# Patient Record
Sex: MALE | Race: WHITE | NOT HISPANIC OR LATINO | URBAN - METROPOLITAN AREA
[De-identification: names, ages, dates, MRNs, and addresses within clinical notes are randomized per-mention and may not be internally consistent; named-entity substitution may affect disease eponyms.]

---

## 2017-09-23 ENCOUNTER — EMERGENCY (EMERGENCY)
Facility: HOSPITAL | Age: 62
LOS: 1 days | Discharge: HOME | End: 2017-09-23
Attending: EMERGENCY MEDICINE | Admitting: EMERGENCY MEDICINE
Payer: COMMERCIAL

## 2017-09-23 VITALS
HEART RATE: 76 BPM | TEMPERATURE: 98 F | OXYGEN SATURATION: 95 % | WEIGHT: 173.06 LBS | SYSTOLIC BLOOD PRESSURE: 156 MMHG | DIASTOLIC BLOOD PRESSURE: 97 MMHG | RESPIRATION RATE: 18 BRPM

## 2017-09-23 LAB
ALBUMIN SERPL ELPH-MCNC: 4.2 G/DL — SIGNIFICANT CHANGE UP (ref 3.3–5)
ALP SERPL-CCNC: 87 U/L — SIGNIFICANT CHANGE UP (ref 40–120)
ALT FLD-CCNC: 23 U/L RC — SIGNIFICANT CHANGE UP (ref 10–45)
ANION GAP SERPL CALC-SCNC: 15 MMOL/L — SIGNIFICANT CHANGE UP (ref 5–17)
APPEARANCE UR: ABNORMAL
AST SERPL-CCNC: 19 U/L — SIGNIFICANT CHANGE UP (ref 10–40)
BASOPHILS # BLD AUTO: 0 K/UL — SIGNIFICANT CHANGE UP (ref 0–0.2)
BASOPHILS NFR BLD AUTO: 0.5 % — SIGNIFICANT CHANGE UP (ref 0–2)
BILIRUB SERPL-MCNC: 0.5 MG/DL — SIGNIFICANT CHANGE UP (ref 0.2–1.2)
BILIRUB UR-MCNC: NEGATIVE — SIGNIFICANT CHANGE UP
BUN SERPL-MCNC: 23 MG/DL — SIGNIFICANT CHANGE UP (ref 7–23)
CALCIUM SERPL-MCNC: 9 MG/DL — SIGNIFICANT CHANGE UP (ref 8.4–10.5)
CHLORIDE SERPL-SCNC: 102 MMOL/L — SIGNIFICANT CHANGE UP (ref 96–108)
CO2 SERPL-SCNC: 25 MMOL/L — SIGNIFICANT CHANGE UP (ref 22–31)
COLOR SPEC: YELLOW — SIGNIFICANT CHANGE UP
CREAT SERPL-MCNC: 1.26 MG/DL — SIGNIFICANT CHANGE UP (ref 0.5–1.3)
DIFF PNL FLD: ABNORMAL
EOSINOPHIL # BLD AUTO: 0.4 K/UL — SIGNIFICANT CHANGE UP (ref 0–0.5)
EOSINOPHIL NFR BLD AUTO: 4.7 % — SIGNIFICANT CHANGE UP (ref 0–6)
GLUCOSE SERPL-MCNC: 109 MG/DL — HIGH (ref 70–99)
GLUCOSE UR QL: NEGATIVE — SIGNIFICANT CHANGE UP
HCT VFR BLD CALC: 44.1 % — SIGNIFICANT CHANGE UP (ref 39–50)
HGB BLD-MCNC: 15.2 G/DL — SIGNIFICANT CHANGE UP (ref 13–17)
KETONES UR-MCNC: NEGATIVE — SIGNIFICANT CHANGE UP
LEUKOCYTE ESTERASE UR-ACNC: NEGATIVE — SIGNIFICANT CHANGE UP
LIDOCAIN IGE QN: 17 U/L — SIGNIFICANT CHANGE UP (ref 7–60)
LYMPHOCYTES # BLD AUTO: 1.2 K/UL — SIGNIFICANT CHANGE UP (ref 1–3.3)
LYMPHOCYTES # BLD AUTO: 13.8 % — SIGNIFICANT CHANGE UP (ref 13–44)
MCHC RBC-ENTMCNC: 31.7 PG — SIGNIFICANT CHANGE UP (ref 27–34)
MCHC RBC-ENTMCNC: 34.4 GM/DL — SIGNIFICANT CHANGE UP (ref 32–36)
MCV RBC AUTO: 92 FL — SIGNIFICANT CHANGE UP (ref 80–100)
MONOCYTES # BLD AUTO: 0.7 K/UL — SIGNIFICANT CHANGE UP (ref 0–0.9)
MONOCYTES NFR BLD AUTO: 8 % — SIGNIFICANT CHANGE UP (ref 2–14)
NEUTROPHILS # BLD AUTO: 6.6 K/UL — SIGNIFICANT CHANGE UP (ref 1.8–7.4)
NEUTROPHILS NFR BLD AUTO: 73.1 % — SIGNIFICANT CHANGE UP (ref 43–77)
NITRITE UR-MCNC: NEGATIVE — SIGNIFICANT CHANGE UP
PH UR: 5.5 — SIGNIFICANT CHANGE UP (ref 5–8)
PLATELET # BLD AUTO: 224 K/UL — SIGNIFICANT CHANGE UP (ref 150–400)
POTASSIUM SERPL-MCNC: 3.9 MMOL/L — SIGNIFICANT CHANGE UP (ref 3.5–5.3)
POTASSIUM SERPL-SCNC: 3.9 MMOL/L — SIGNIFICANT CHANGE UP (ref 3.5–5.3)
PROT SERPL-MCNC: 7.1 G/DL — SIGNIFICANT CHANGE UP (ref 6–8.3)
PROT UR-MCNC: 30 MG/DL
RBC # BLD: 4.79 M/UL — SIGNIFICANT CHANGE UP (ref 4.2–5.8)
RBC # FLD: 12.2 % — SIGNIFICANT CHANGE UP (ref 10.3–14.5)
RBC CASTS # UR COMP ASSIST: >50 /HPF (ref 0–2)
SODIUM SERPL-SCNC: 142 MMOL/L — SIGNIFICANT CHANGE UP (ref 135–145)
SP GR SPEC: 1.02 — SIGNIFICANT CHANGE UP (ref 1.01–1.02)
UROBILINOGEN FLD QL: NEGATIVE — SIGNIFICANT CHANGE UP
WBC # BLD: 9 K/UL — SIGNIFICANT CHANGE UP (ref 3.8–10.5)
WBC # FLD AUTO: 9 K/UL — SIGNIFICANT CHANGE UP (ref 3.8–10.5)

## 2017-09-23 PROCEDURE — 74176 CT ABD & PELVIS W/O CONTRAST: CPT | Mod: 26

## 2017-09-23 PROCEDURE — 99285 EMERGENCY DEPT VISIT HI MDM: CPT | Mod: 25

## 2017-09-23 PROCEDURE — 99284 EMERGENCY DEPT VISIT MOD MDM: CPT

## 2017-09-23 RX ORDER — ONDANSETRON 8 MG/1
4 TABLET, FILM COATED ORAL ONCE
Qty: 0 | Refills: 0 | Status: COMPLETED | OUTPATIENT
Start: 2017-09-23 | End: 2017-09-23

## 2017-09-23 RX ORDER — SODIUM CHLORIDE 9 MG/ML
1000 INJECTION INTRAMUSCULAR; INTRAVENOUS; SUBCUTANEOUS ONCE
Qty: 0 | Refills: 0 | Status: COMPLETED | OUTPATIENT
Start: 2017-09-23 | End: 2017-09-23

## 2017-09-23 RX ORDER — MORPHINE SULFATE 50 MG/1
4 CAPSULE, EXTENDED RELEASE ORAL ONCE
Qty: 0 | Refills: 0 | Status: DISCONTINUED | OUTPATIENT
Start: 2017-09-23 | End: 2017-09-23

## 2017-09-23 RX ORDER — KETOROLAC TROMETHAMINE 30 MG/ML
30 SYRINGE (ML) INJECTION ONCE
Qty: 0 | Refills: 0 | Status: DISCONTINUED | OUTPATIENT
Start: 2017-09-23 | End: 2017-09-23

## 2017-09-23 RX ORDER — SODIUM CHLORIDE 9 MG/ML
3 INJECTION INTRAMUSCULAR; INTRAVENOUS; SUBCUTANEOUS ONCE
Qty: 0 | Refills: 0 | Status: COMPLETED | OUTPATIENT
Start: 2017-09-23 | End: 2017-09-23

## 2017-09-23 RX ORDER — TAMSULOSIN HYDROCHLORIDE 0.4 MG/1
0.4 CAPSULE ORAL AT BEDTIME
Qty: 0 | Refills: 0 | Status: DISCONTINUED | OUTPATIENT
Start: 2017-09-23 | End: 2017-09-27

## 2017-09-23 RX ADMIN — ONDANSETRON 4 MILLIGRAM(S): 8 TABLET, FILM COATED ORAL at 20:53

## 2017-09-23 RX ADMIN — SODIUM CHLORIDE 3 MILLILITER(S): 9 INJECTION INTRAMUSCULAR; INTRAVENOUS; SUBCUTANEOUS at 20:53

## 2017-09-23 RX ADMIN — SODIUM CHLORIDE 2000 MILLILITER(S): 9 INJECTION INTRAMUSCULAR; INTRAVENOUS; SUBCUTANEOUS at 23:59

## 2017-09-23 RX ADMIN — MORPHINE SULFATE 4 MILLIGRAM(S): 50 CAPSULE, EXTENDED RELEASE ORAL at 22:52

## 2017-09-23 RX ADMIN — MORPHINE SULFATE 4 MILLIGRAM(S): 50 CAPSULE, EXTENDED RELEASE ORAL at 22:38

## 2017-09-23 RX ADMIN — TAMSULOSIN HYDROCHLORIDE 0.4 MILLIGRAM(S): 0.4 CAPSULE ORAL at 23:58

## 2017-09-23 RX ADMIN — MORPHINE SULFATE 4 MILLIGRAM(S): 50 CAPSULE, EXTENDED RELEASE ORAL at 20:53

## 2017-09-23 NOTE — ED ADULT NURSE NOTE - OBJECTIVE STATEMENT
62 y/o male pmh 60 y/o male A&Ox4, pmh MI with stents, HTN, kidney stones, HLD, presents to ED with c.o left sided chest and flank pain that radiates to left groin. Pt states pain started "a quarter to 7" and hasn't gone away. C/o minor nausea with pain, denies fever/chills/SOB/vomiting/diarrhea/ difficulty urinating. Upon further assessment, airway patent and intact, ABD soft nontender, denies blood in urine and/or stool. Skin intact. Peripheral pulses strong and normal baseline sensation present x4. Safety and comfort measures maintained.

## 2017-09-23 NOTE — ED ADULT NURSE REASSESSMENT NOTE - NS ED NURSE REASSESS COMMENT FT1
Pt states his pain is only slightly improved, MD to be made aware. Patient resting with spouse bedside.

## 2017-09-23 NOTE — ED PROVIDER NOTE - OBJECTIVE STATEMENT
61 YOM presents to ED with left flank pain radiating to left groin sudden onset 3 hours ago. 61 YOM presents to ED with left flank pain radiating to left groin sudden onset 3 hours ago, +nausea, sudden onset pain after eating. No fevers, no chills, no dysuria, no chest pain, no sob. +hx of MI in past. Pt states pain is similar to stone pain he has previously, never required urologic intervention.

## 2017-09-23 NOTE — ED PROVIDER NOTE - MEDICAL DECISION MAKING DETAILS
67YOM pmh MI, Kidney stones presents to ED with Left flank pain radiating to left groin, no fevers, no chills, +nausea, no dysuria, left lower quadrant abdominal tenderness, concern for kidney stone will check urine, basic blood work, and CT abd/pelvis stone hunt. Diverticulitis also on ddx, but lower than stone so will go with non-contrast CT. Reassess after pain control. 67YOM pmh MI, Kidney stones presents to ED with Left flank pain radiating to left groin, no fevers, no chills, +nausea, no dysuria, left lower quadrant abdominal tenderness, concern for kidney stone will check urine, basic blood work, and CT abd/pelvis stone hunt. Diverticulitis also on ddx, but lower than stone so will go with non-contrast CT. Reassess after pain control.ZR

## 2017-09-23 NOTE — ED ADULT TRIAGE NOTE - CHIEF COMPLAINT QUOTE
left abdominal pain radiating to left chest x 1 -2 hours; no sob; no diaphoresis; nausea; hx of cardiac stents and kidney stones

## 2017-09-23 NOTE — ED PROVIDER NOTE - NS ED ROS FT
CONSTITUTIONAL: No fevers, no chills  Eyes: no visual changes  Ears: no ear drainage, no ear pain  Nose: no nasal congestion  Mouth/Throat: no sore throat  Cardiovascular: No Chest pain  Respiratory: No SOB  Gastrointestinal: +nausea +abdominal pain   Genitourinary: no dysuria, no hematuria  SKIN: no rashes.  NEURO: no headache  PSYCHIATRIC: no known mental health issues.

## 2017-09-24 VITALS
DIASTOLIC BLOOD PRESSURE: 71 MMHG | OXYGEN SATURATION: 96 % | TEMPERATURE: 98 F | HEART RATE: 77 BPM | RESPIRATION RATE: 18 BRPM | SYSTOLIC BLOOD PRESSURE: 112 MMHG

## 2017-09-24 PROCEDURE — 96376 TX/PRO/DX INJ SAME DRUG ADON: CPT

## 2017-09-24 PROCEDURE — 83690 ASSAY OF LIPASE: CPT

## 2017-09-24 PROCEDURE — 74176 CT ABD & PELVIS W/O CONTRAST: CPT

## 2017-09-24 PROCEDURE — 96375 TX/PRO/DX INJ NEW DRUG ADDON: CPT

## 2017-09-24 PROCEDURE — 85027 COMPLETE CBC AUTOMATED: CPT

## 2017-09-24 PROCEDURE — 80053 COMPREHEN METABOLIC PANEL: CPT

## 2017-09-24 PROCEDURE — 96374 THER/PROPH/DIAG INJ IV PUSH: CPT

## 2017-09-24 PROCEDURE — 87086 URINE CULTURE/COLONY COUNT: CPT

## 2017-09-24 PROCEDURE — 99284 EMERGENCY DEPT VISIT MOD MDM: CPT | Mod: 25

## 2017-09-24 PROCEDURE — 93010 ELECTROCARDIOGRAM REPORT: CPT | Mod: 59

## 2017-09-24 PROCEDURE — G0378: CPT

## 2017-09-24 PROCEDURE — 93005 ELECTROCARDIOGRAM TRACING: CPT

## 2017-09-24 PROCEDURE — 81001 URINALYSIS AUTO W/SCOPE: CPT

## 2017-09-24 PROCEDURE — 99236 HOSP IP/OBS SAME DATE HI 85: CPT

## 2017-09-24 RX ORDER — ACETAMINOPHEN 500 MG
1000 TABLET ORAL ONCE
Qty: 0 | Refills: 0 | Status: COMPLETED | OUTPATIENT
Start: 2017-09-24 | End: 2017-09-24

## 2017-09-24 RX ORDER — ASPIRIN/CALCIUM CARB/MAGNESIUM 324 MG
81 TABLET ORAL DAILY
Qty: 0 | Refills: 0 | Status: DISCONTINUED | OUTPATIENT
Start: 2017-09-24 | End: 2017-09-27

## 2017-09-24 RX ORDER — CLOPIDOGREL BISULFATE 75 MG/1
75 TABLET, FILM COATED ORAL DAILY
Qty: 0 | Refills: 0 | Status: DISCONTINUED | OUTPATIENT
Start: 2017-09-24 | End: 2017-09-27

## 2017-09-24 RX ORDER — SODIUM CHLORIDE 9 MG/ML
3 INJECTION INTRAMUSCULAR; INTRAVENOUS; SUBCUTANEOUS EVERY 8 HOURS
Qty: 0 | Refills: 0 | Status: DISCONTINUED | OUTPATIENT
Start: 2017-09-24 | End: 2017-09-27

## 2017-09-24 RX ORDER — TAMSULOSIN HYDROCHLORIDE 0.4 MG/1
1 CAPSULE ORAL
Qty: 8 | Refills: 0 | OUTPATIENT
Start: 2017-09-24 | End: 2017-10-02

## 2017-09-24 RX ORDER — DIAZEPAM 5 MG
5 TABLET ORAL ONCE
Qty: 0 | Refills: 0 | Status: DISCONTINUED | OUTPATIENT
Start: 2017-09-24 | End: 2017-09-24

## 2017-09-24 RX ORDER — TAMSULOSIN HYDROCHLORIDE 0.4 MG/1
1 CAPSULE ORAL
Qty: 7 | Refills: 0
Start: 2017-09-24 | End: 2017-10-01

## 2017-09-24 RX ORDER — TAMSULOSIN HYDROCHLORIDE 0.4 MG/1
1 CAPSULE ORAL
Qty: 7 | Refills: 0 | OUTPATIENT
Start: 2017-09-24 | End: 2017-10-01

## 2017-09-24 RX ORDER — ATORVASTATIN CALCIUM 80 MG/1
20 TABLET, FILM COATED ORAL AT BEDTIME
Qty: 0 | Refills: 0 | Status: DISCONTINUED | OUTPATIENT
Start: 2017-09-24 | End: 2017-09-27

## 2017-09-24 RX ORDER — METOPROLOL TARTRATE 50 MG
25 TABLET ORAL DAILY
Qty: 0 | Refills: 0 | Status: DISCONTINUED | OUTPATIENT
Start: 2017-09-24 | End: 2017-09-27

## 2017-09-24 RX ORDER — SODIUM CHLORIDE 9 MG/ML
1000 INJECTION INTRAMUSCULAR; INTRAVENOUS; SUBCUTANEOUS
Qty: 0 | Refills: 0 | Status: DISCONTINUED | OUTPATIENT
Start: 2017-09-24 | End: 2017-09-27

## 2017-09-24 RX ORDER — KETOROLAC TROMETHAMINE 30 MG/ML
30 SYRINGE (ML) INJECTION ONCE
Qty: 0 | Refills: 0 | Status: DISCONTINUED | OUTPATIENT
Start: 2017-09-24 | End: 2017-09-24

## 2017-09-24 RX ORDER — MORPHINE SULFATE 50 MG/1
2 CAPSULE, EXTENDED RELEASE ORAL ONCE
Qty: 0 | Refills: 0 | Status: DISCONTINUED | OUTPATIENT
Start: 2017-09-24 | End: 2017-09-24

## 2017-09-24 RX ADMIN — Medication 81 MILLIGRAM(S): at 05:12

## 2017-09-24 RX ADMIN — SODIUM CHLORIDE 3 MILLILITER(S): 9 INJECTION INTRAMUSCULAR; INTRAVENOUS; SUBCUTANEOUS at 05:15

## 2017-09-24 RX ADMIN — SODIUM CHLORIDE 150 MILLILITER(S): 9 INJECTION INTRAMUSCULAR; INTRAVENOUS; SUBCUTANEOUS at 05:24

## 2017-09-24 RX ADMIN — SODIUM CHLORIDE 150 MILLILITER(S): 9 INJECTION INTRAMUSCULAR; INTRAVENOUS; SUBCUTANEOUS at 09:36

## 2017-09-24 RX ADMIN — MORPHINE SULFATE 4 MILLIGRAM(S): 50 CAPSULE, EXTENDED RELEASE ORAL at 00:01

## 2017-09-24 RX ADMIN — Medication 30 MILLIGRAM(S): at 09:36

## 2017-09-24 RX ADMIN — Medication 5 MILLIGRAM(S): at 02:02

## 2017-09-24 RX ADMIN — MORPHINE SULFATE 2 MILLIGRAM(S): 50 CAPSULE, EXTENDED RELEASE ORAL at 05:58

## 2017-09-24 RX ADMIN — Medication 1000 MILLIGRAM(S): at 03:08

## 2017-09-24 RX ADMIN — MORPHINE SULFATE 2 MILLIGRAM(S): 50 CAPSULE, EXTENDED RELEASE ORAL at 05:27

## 2017-09-24 RX ADMIN — Medication 400 MILLIGRAM(S): at 01:45

## 2017-09-24 RX ADMIN — SODIUM CHLORIDE 150 MILLILITER(S): 9 INJECTION INTRAMUSCULAR; INTRAVENOUS; SUBCUTANEOUS at 01:44

## 2017-09-24 RX ADMIN — ATORVASTATIN CALCIUM 20 MILLIGRAM(S): 80 TABLET, FILM COATED ORAL at 05:12

## 2017-09-24 RX ADMIN — CLOPIDOGREL BISULFATE 75 MILLIGRAM(S): 75 TABLET, FILM COATED ORAL at 05:12

## 2017-09-24 NOTE — CONSULT NOTE ADULT - ASSESSMENT
61 year old male with left renal colic    -observation for pain control  -Keep NPO in case patient needs procedure  -IVF hydration  -flomax qd  -analgesia as needed  -re-evaluate  -case d/w Dr Hoenig

## 2017-09-24 NOTE — ED ADULT NURSE REASSESSMENT NOTE - NS ED NURSE REASSESS COMMENT FT1
Received pt from SHERRI Soto, received pt alert and responsive, oriented x4, denies any respiratory distress, SOB, or difficulty breathing. Pt transferred to CDU for observation for L flank and groin pain, will medicate for pas as ordered pt states 7/10, IV in place, patent and free of signs of infiltration, IV fluids infusing per order. Strainers provided for urine.   pt denies chest pain or palpitations, V/S stable, pt afebrile,  Pt educated on unit and unit rules, instructed patient to notify RN of any needed assistance, Pt verbalizes understanding, Call bell placed within reach. Safety maintained. Will continue to monitor.

## 2017-09-24 NOTE — ED ADULT NURSE REASSESSMENT NOTE - GENERAL PATIENT STATE
comfortable appearance/improvement verbalized/cooperative
comfortable appearance/smiling/interactive/cooperative/resting/sleeping

## 2017-09-24 NOTE — PROGRESS NOTE ADULT - ASSESSMENT
61 year old male with left renal colic    - PO analgesia  - NPO, IVF  - flomax, strain urine 61 year old male with left renal colic    - PO analgesia  - PO trial, encourage PO intake  - flomax, strain urine  - pt prefers to try medical expulsive therapy at this time w/ f/u at [facility]

## 2017-09-24 NOTE — ED CDU PROVIDER NOTE - MEDICAL DECISION MAKING DETAILS
62 y/o M p/w renal colic found to have 6mm stone L ureter, significant pain, seen by urology recommending CDU for pain control and reevaluation in AM for need for procedure. JASSI.

## 2017-09-24 NOTE — CONSULT NOTE ADULT - SUBJECTIVE AND OBJECTIVE BOX
61 year old male with PMHx of CAD s/p 2 stents, HTN, HChol, kidney stones, presents with acute onset of left sided flank pain this evening.  Denies CP, SOB, nausea, vomiting, changes in urinary habits.  Currently not pain controlled in the ER.  States he has passed kidney stones before.    PAST MEDICAL & SURGICAL HISTORY:  Stented coronary artery  High cholesterol  Hypertension  Kidney stones  No significant past surgical history      FAMILY HISTORY:      SOCIAL HISTORY:   noncontributory    tamsulosin 0.4 milliGRAM(s) Oral at bedtime      Allergies    No Known Allergies    Intolerances        REVIEW OF SYSTEMS: Pertinent positives and negatives as stated in HPI, otherwise negative    Vital signs  T(C): 36.9 (09-23-17 @ 22:25), Max: 36.9 (09-23-17 @ 22:25)  HR: 85 (09-23-17 @ 22:25)  BP: 131/99 (09-23-17 @ 22:25)  SpO2: 97% (09-23-17 @ 22:25)  Wt(kg): --    I&O's Summary      Physical Exam  Gen: NAD, A+Ox3  Pulm: CTA bilaterally  CV: S1S2, RRR  Abd: Soft, NT/ND  Back: +left CVAT  Ext: No edema present b/l    LABS:                            15.2   9.0   )-----------( 224      ( 23 Sep 2017 20:57 )             44.1     09-23    142  |  102  |  23  ----------------------------<  109<H>  3.9   |  25  |  1.26    Ca    9.0      23 Sep 2017 20:57    TPro  7.1  /  Alb  4.2  /  TBili  0.5  /  DBili  x   /  AST  19  /  ALT  23  /  AlkPhos  87  09-23      Urinalysis Basic - ( 23 Sep 2017 22:25 )    Color: x / Appearance: x / SG: x / pH: x  Gluc: x / Ketone: x  / Bili: x / Urobili: x   Blood: x / Protein: x / Nitrite: x   Leuk Esterase: x / RBC: >50 /HPF / WBC x   Sq Epi: x / Non Sq Epi: x / Bacteria: x      Urine culture: pending      CT: 6 mm obstructing calculus in the left ureter with mild proximal left-sided  hydroureter nephrosis. Multiple additional left and right intrarenal  calculus described above. No right hydronephrosis.

## 2017-09-24 NOTE — PROGRESS NOTE ADULT - ATTENDING COMMENTS
Pt seen, examined, films reviewed.  Pain managed overnight with IV therapy- this AM much improved, with appetite.  I had long d/w pt regarding options, including observation with discharge, emergent stent placement, waiting for negative urine culture and proceeding with definitive therapy (ESWL or URS)... given pt on plavix and with stones in both ureter and kidney, URS with laser/stone removal, stent would be more ideal treatment option.    Pt prefers to try diet, oral pain meds, and discharge planning with f/u.

## 2017-09-24 NOTE — ED CDU PROVIDER NOTE - DETAILS
60 y/o M p/w renal colic now with 6mm L ureteral stone  - observation status with frequent re-evaluations  - IVF  - pain control prn  - NPO after midnight  - pt being followed by urology  - plan discussed with Dr. Calvo

## 2017-09-24 NOTE — ED CDU PROVIDER NOTE - ATTENDING CONTRIBUTION TO CARE
60 y/o M p/w renal colic found to have 6mm stone L ureter, significant pain, seen by urology recommending CDU for pain control and reevaluation in AM for need for procedure. JASSI.

## 2017-09-24 NOTE — ED CDU PROVIDER NOTE - PROGRESS NOTE DETAILS
Patient seen and evaluated at bedside, resting comfortably, NAD. Reports mild L flank pain. Denies fever/chills, nausea/vomiting. VSS. Awaiting urology re-evaluation. - Nedra Smith PA-C nitesh - pt comfortable , tolerating po, pain well controlled - seen by urology this am - awaitng final reccs - wes tyson with analgesia and out pt f/u Pt seen by urology in CDU. Stable for d/c home on flomax, PO analgesia, and f/u with Dr. Hoenig as outpatient. D/w SHANI NullC Pt reports he has oxycodone at home for chronic back pain, requesting dilaudid today. Per [facility] Reference #: [id number]; pt rx'ed 60 tabs of percocet 10-325mg on 9/9/17. - Nedra Smith PA-C I , Dr Jaya Armando has seen and evaluated the patient.  To follow up with urology this weel The patient reports improvement in symptoms.  The patient is stable for discharge home

## 2017-09-24 NOTE — ED CDU PROVIDER NOTE - PLAN OF CARE
(1) You will need to follow up with your PMD _________ and our recommended urologist (___) in 2-3 days for your kidney stone. A copy of your results were given to you to bring to your appt. Read attached discharge paperwork.  (2) Drink plenty of fluids to stay hydrated.  (3) Continue your home medications as directed along with Flomax 0.4mg orally once daily until you pass your stone; Oxycodone 5mg orally every 6hours as needed for pain; and Zofran ODT 4mg orally every 6hours as needed for nausea/vomiting.  (4) Return to ER for severe pain, trouble breathing, unable to urinate, or any other concerns. (1) You will need to follow up with your PMD and our recommended urologist Dr. Hoenig in 2-3 days for your kidney stone. A copy of your results were given to you to bring to your appt. Read attached discharge paperwork.  (2) Drink plenty of fluids to stay hydrated. Continue to strain your urine.   (3) Continue your home medications as directed along with Flomax 0.4mg orally once daily until you pass your stone; Motrin 600mg every 8 hours for pain as needed, Oxycodone 5mg orally every 6-8hours as needed for severe pain; CAUTION - drowsiness do not drink/drive while taking this medication.   (4) Return to ER for severe pain, trouble breathing, unable to urinate, vomiting, fever, or any other concerns.

## 2017-09-24 NOTE — ED CDU PROVIDER NOTE - OBJECTIVE STATEMENT
61 YOM pmhx CAD with stents, htn, hld, renal stones in past, presents to ED with left flank pain radiating to left groin sudden onset 3 hours ago, +nausea, sudden onset pain after eating. No fevers, no chills, no dysuria, no chest pain, no sob. +hx of MI in past. Pt states pain is similar to stone pain he has previously, never required urologic intervention.

## 2017-09-24 NOTE — ED ADULT NURSE REASSESSMENT NOTE - COMFORT CARE
po fluids offered
plan of care explained/po fluids offered/ambulated to bathroom/darkened lights/meal provided/repositioned/warm blanket provided

## 2017-09-24 NOTE — ED ADULT NURSE REASSESSMENT NOTE - NS ED NURSE REASSESS COMMENT FT1
Patient reports no change in pain. MD to be made aware Patient reports no change in pain. Efren SUTHERLAND made aware. Urology bedside

## 2017-09-24 NOTE — PROGRESS NOTE ADULT - SUBJECTIVE AND OBJECTIVE BOX
Subjective  No overnight events. Received tylenol and morphine overnight. Pain controlled.     Objective    Vital signs  T(F): , Max: 98.4 (09-23-17 @ 22:25)  HR: 71 (09-24-17 @ 08:00)  BP: 106/69 (09-24-17 @ 08:00)  SpO2: 95% (09-24-17 @ 08:00)  Wt(kg): --    Output       Gen: NAD  Abd: soft, NT, ND  Back: no CVAT b/l    Labs      09-23 @ 20:57    WBC 9.0   / Hct 44.1  / SCr 1.26     Imaging  < from: CT Abdomen and Pelvis No Cont (09.23.17 @ 21:05) >    6 mm obstructing calculus in the left ureter with mild proximal   left-sided hydroureter nephrosis. Multiple additional left and right   intrarenal calculus described above. No right hydronephrosis.      < end of copied text >

## 2017-09-25 LAB
CULTURE RESULTS: NO GROWTH — SIGNIFICANT CHANGE UP
SPECIMEN SOURCE: SIGNIFICANT CHANGE UP

## 2017-10-04 ENCOUNTER — APPOINTMENT (OUTPATIENT)
Dept: UROLOGY | Facility: CLINIC | Age: 62
End: 2017-10-04
Payer: COMMERCIAL

## 2017-10-04 VITALS
SYSTOLIC BLOOD PRESSURE: 137 MMHG | WEIGHT: 173 LBS | BODY MASS INDEX: 27.8 KG/M2 | HEIGHT: 66 IN | DIASTOLIC BLOOD PRESSURE: 88 MMHG | HEART RATE: 78 BPM | TEMPERATURE: 97.7 F | RESPIRATION RATE: 16 BRPM

## 2017-10-04 DIAGNOSIS — Z78.9 OTHER SPECIFIED HEALTH STATUS: ICD-10-CM

## 2017-10-04 DIAGNOSIS — Z87.442 PERSONAL HISTORY OF URINARY CALCULI: ICD-10-CM

## 2017-10-04 PROCEDURE — 99214 OFFICE O/P EST MOD 30 MIN: CPT

## 2017-10-04 RX ORDER — METOPROLOL TARTRATE 25 MG/1
25 TABLET, FILM COATED ORAL
Refills: 0 | Status: ACTIVE | COMMUNITY

## 2017-10-04 RX ORDER — ASPIRIN 81 MG
81 TABLET, DELAYED RELEASE (ENTERIC COATED) ORAL
Refills: 0 | Status: ACTIVE | COMMUNITY

## 2017-10-04 RX ORDER — DIAZEPAM 5 MG/1
TABLET ORAL
Refills: 0 | Status: ACTIVE | COMMUNITY

## 2017-10-04 RX ORDER — ATORVASTATIN CALCIUM 20 MG/1
20 TABLET, FILM COATED ORAL
Refills: 0 | Status: ACTIVE | COMMUNITY

## 2017-10-06 LAB — COMPN STONE: NORMAL

## 2017-11-22 ENCOUNTER — APPOINTMENT (OUTPATIENT)
Dept: UROLOGY | Facility: CLINIC | Age: 62
End: 2017-11-22
Payer: COMMERCIAL

## 2017-11-22 ENCOUNTER — OUTPATIENT (OUTPATIENT)
Dept: OUTPATIENT SERVICES | Facility: HOSPITAL | Age: 62
LOS: 1 days | End: 2017-11-22
Payer: COMMERCIAL

## 2017-11-22 DIAGNOSIS — R35.0 FREQUENCY OF MICTURITION: ICD-10-CM

## 2017-11-22 PROCEDURE — 76775 US EXAM ABDO BACK WALL LIM: CPT | Mod: 26

## 2017-11-22 PROCEDURE — 99214 OFFICE O/P EST MOD 30 MIN: CPT | Mod: 25

## 2017-11-22 PROCEDURE — 76775 US EXAM ABDO BACK WALL LIM: CPT

## 2017-11-27 DIAGNOSIS — N20.0 CALCULUS OF KIDNEY: ICD-10-CM

## 2017-11-27 DIAGNOSIS — R82.99 OTHER ABNORMAL FINDINGS IN URINE: ICD-10-CM

## 2018-02-07 ENCOUNTER — APPOINTMENT (OUTPATIENT)
Dept: UROLOGY | Facility: CLINIC | Age: 63
End: 2018-02-07

## 2018-07-13 ENCOUNTER — APPOINTMENT (OUTPATIENT)
Dept: ORTHOPEDIC SURGERY | Facility: CLINIC | Age: 63
End: 2018-07-13
Payer: COMMERCIAL

## 2018-07-13 DIAGNOSIS — M17.12 UNILATERAL PRIMARY OSTEOARTHRITIS, LEFT KNEE: ICD-10-CM

## 2018-07-13 PROCEDURE — 73562 X-RAY EXAM OF KNEE 3: CPT | Mod: LT

## 2018-07-13 PROCEDURE — 99204 OFFICE O/P NEW MOD 45 MIN: CPT | Mod: 25

## 2018-07-13 PROCEDURE — 20610 DRAIN/INJ JOINT/BURSA W/O US: CPT | Mod: LT

## 2018-07-20 PROBLEM — M17.12 PRIMARY OSTEOARTHRITIS OF LEFT KNEE: Status: ACTIVE | Noted: 2018-07-20

## 2018-07-27 PROBLEM — Z78.9 ALCOHOL USE: Status: ACTIVE | Noted: 2017-10-04

## 2018-11-29 ENCOUNTER — RX RENEWAL (OUTPATIENT)
Age: 63
End: 2018-11-29

## 2019-07-03 ENCOUNTER — APPOINTMENT (OUTPATIENT)
Dept: UROLOGY | Facility: CLINIC | Age: 64
End: 2019-07-03
Payer: MEDICARE

## 2019-07-03 PROCEDURE — 99214 OFFICE O/P EST MOD 30 MIN: CPT

## 2019-07-03 RX ORDER — CLOPIDOGREL 75 MG/1
TABLET, FILM COATED ORAL
Refills: 0 | Status: COMPLETED | COMMUNITY
End: 2019-07-03

## 2019-07-03 NOTE — LETTER BODY
[Dear  ___] : Dear  [unfilled], [Attached please find my note.] : Attached please find my note. [I had the pleasure of evaluating your patient, [unfilled]. Thank you for referring this patient for consultation for ___] : I had the pleasure of evaluating your patient, [unfilled]. Thank you for referring this patient for consultation for [unfilled]. [Thank you very much for allowing me to participate in the care of this patient. If you have any questions, please do not hesitate to contact me] : Thank you very much for allowing me to participate in the care of this patient. If you have any questions, please do not hesitate to contact me. [FreeTextEntry1] : Pt is 64 yo male, pt (and friend) of Reece Cleveland, with recurrent stones.  H/o removal by Dr. Nguyen ~ 20 years ago- may have had some uric acid component, currently on allopurinol since; however, has had several recurrent stones, most recently presenting to [facility] with pain, nausea 9/23/17.  Started pot citrate  20 meq bid... now only taking 10 meq bid.  Has not been checking pH.  Reports recent exam and PSA wnl.\par \par Pt reports generally doing well.  On flomax for mild BPH sx with nocturia.  Inquiring about Viagra for ED.  Has been on ~ 100 mg tabs (breaking in half) for 50 mg dose. \par Most recent stone was %, (but h/o UA stones in past)\par Mild nocturia.  No sig difficulties with emptying, no abd or flank pain, no hematuria.\par \par On allopurinol ("i'm taking 3 times a week") and pot citrate ("i'm only taking 2 pills a day, instead of 4- I do what I feel is right").  \par \par Diet modification reviewed at length- increasing fluids (primarily water), citrus is good, and decreasing/moderating salt, animal flesh protein, oxalate containing foods, and moderation of calcium intake (1000 mg/day is USRDA)\par \par We also discussed benefits of regular exercise and weight loss as independent risk reducers for stones.  Diet mod sheet given.\par \par We spoke about pot citrate and pH titration: \par Pt instructed to self test urine pH, 2 to 3 times per day using pH paper, and to record results over next ~ 1 week.\par \par Target pH range for UA stone prevention is between 6-7\par \par If pH < 6, pt to call and will expect/need to titrate up.\par \par 1. cont pot citrate- if ph< 6, would increase back up to 20 meq bid.  \par 2. Can try litholyte instead of script- info given for [email or web address]\par 3. Pt desires viagra 100, has been taking for years-- prescribed\par 4. Will arrange renal us.\par 5. cont flomax- script sent\par  [DrRoman  ___] : Dr. MILLAN

## 2019-07-03 NOTE — ASSESSMENT
[FreeTextEntry1] : On allopurinol ("i'm taking 3 times a week") and pot citrate ("i'm only taking 2 pills a day, instead of 4- I do what I feel is right").  \par \par Diet modification reviewed at length- increasing fluids (primarily water), citrus is good, and decreasing/moderating salt, animal flesh protein, oxalate containing foods, and moderation of calcium intake (1000 mg/day is USRDA)\par \par We also discussed benefits of regular exercise and weight loss as independent risk reducers for stones.  Diet mod sheet given.\par \par We spoke about pot citrate and pH titration: \par Pt instructed to self test urine pH, 2 to 3 times per day using pH paper, and to record results over next ~ 1 week.\par \par Target pH range for UA stone prevention is between 6-7\par \par If pH < 6, pt to call and will expect/need to titrate up.\par \par 1. cont pot citrate- if ph< 6, would increase back up to 20 meq bid.  \par 2. Can try litholyte instead of script- info given for [email or web address]\par 3. Pt desires viagra 100, has been taking for years-- prescribed\par 4. Will arrange renal us.\par 5. cont flomax- script sent\par \par \par

## 2019-07-03 NOTE — HISTORY OF PRESENT ILLNESS
[FreeTextEntry1] : Pt is 64 yo male, pt (and friend) of Reece Megha, with recurrent stones.  H/o removal by Dr. Nguyen ~ 20 years ago- may have had some uric acid component, currently on allopurinol since; however, has had several recurrent stones, most recently presenting to [facility] with pain, nausea 9/23/17.  Started pot citrate  20 meq bid... now only taking 10 meq bid.  Has not been checking pH.  Reports recent exam and PSA wnl.\par \par Pt reports generally doing well.  On flomax for mild BPH sx with nocturia.  Inquiring about Viagra for ED.  Has been on ~ 100 mg tabs (breaking in half) for 50 mg dose. \par Most recent stone was %, (but h/o UA stones in past)\par Mild nocturia.  No sig difficulties with emptying, no abd or flank pain, no hematuria. [None] : no symptoms

## 2019-07-03 NOTE — REVIEW OF SYSTEMS
[Shortness Of Breath] : shortness of breath [Seen by urologist before (Name)  ___] : Preciously seen by a urologist: [unfilled] [Easy Bruising] : a tendency for easy bruising [Joint Pain] : joint pain [Negative] : Endocrine

## 2019-07-03 NOTE — PHYSICAL EXAM
[General Appearance - Well Developed] : well developed [General Appearance - Well Nourished] : well nourished [Normal Appearance] : normal appearance [Well Groomed] : well groomed [General Appearance - In No Acute Distress] : no acute distress [Abdomen Soft] : soft [Abdomen Tenderness] : non-tender [Costovertebral Angle Tenderness] : no ~M costovertebral angle tenderness [Urinary Bladder Findings] : the bladder was normal on palpation [FreeTextEntry1] : deferred at pt request [Respiration, Rhythm And Depth] : normal respiratory rhythm and effort [Edema] : no peripheral edema [] : no respiratory distress [Exaggerated Use Of Accessory Muscles For Inspiration] : no accessory muscle use [Affect] : the affect was normal [Oriented To Time, Place, And Person] : oriented to person, place, and time [Mood] : the mood was normal [Normal Station and Gait] : the gait and station were normal for the patient's age [Not Anxious] : not anxious [No Focal Deficits] : no focal deficits

## 2019-08-07 ENCOUNTER — OUTPATIENT (OUTPATIENT)
Dept: OUTPATIENT SERVICES | Facility: HOSPITAL | Age: 64
LOS: 1 days | End: 2019-08-07
Payer: MEDICARE

## 2019-08-07 ENCOUNTER — APPOINTMENT (OUTPATIENT)
Dept: UROLOGY | Facility: CLINIC | Age: 64
End: 2019-08-07
Payer: MEDICARE

## 2019-08-07 DIAGNOSIS — R35.0 FREQUENCY OF MICTURITION: ICD-10-CM

## 2019-08-07 PROCEDURE — 76775 US EXAM ABDO BACK WALL LIM: CPT

## 2019-08-07 PROCEDURE — 76775 US EXAM ABDO BACK WALL LIM: CPT | Mod: 26

## 2019-08-07 PROCEDURE — 99213 OFFICE O/P EST LOW 20 MIN: CPT | Mod: 25

## 2019-08-07 NOTE — PHYSICAL EXAM
[General Appearance - Well Developed] : well developed [Normal Appearance] : normal appearance [General Appearance - Well Nourished] : well nourished [General Appearance - In No Acute Distress] : no acute distress [Well Groomed] : well groomed [Exaggerated Use Of Accessory Muscles For Inspiration] : no accessory muscle use [Respiration, Rhythm And Depth] : normal respiratory rhythm and effort [] : no respiratory distress [Oriented To Time, Place, And Person] : oriented to person, place, and time [Affect] : the affect was normal [Not Anxious] : not anxious [Mood] : the mood was normal [Normal Station and Gait] : the gait and station were normal for the patient's age

## 2019-08-07 NOTE — HISTORY OF PRESENT ILLNESS
[FreeTextEntry1] : Pt is 64 yo male, pt (and friend) of Reece Cleveland, with recurrent stones. H/o removal by Dr. Nguyen ~ 20 years ago- may have had some uric acid component, currently on allopurinol since; however, has had several recurrent stones, most recently presenting to [facility] with pain, nausea 9/23/17. Started pot citrate 20 meq bid... now only taking 10 meq bid. Has not been checking pH. Reports recent exam and PSA wnl.\par \par \par Presumed mixed stone risks

## 2019-08-07 NOTE — ASSESSMENT
[FreeTextEntry1] : Renal US reviewed- probable 3 mm stone right upper pole with twinkle artifact. No hydro. Small echogenic focus in right mid parenchyma, not likely stone. No left stone. No hydro.  Small cyst right mid, left upper.  No solid mass.\par \par Pt will increase to pot citrate to 20 bid.  Cont allopurinol.\par \par Diet mod.\par \par RTC 6 months with renal US\par \par

## 2019-08-15 ENCOUNTER — MEDICATION RENEWAL (OUTPATIENT)
Age: 64
End: 2019-08-15

## 2019-08-16 DIAGNOSIS — N20.9 URINARY CALCULUS, UNSPECIFIED: ICD-10-CM

## 2019-08-16 DIAGNOSIS — N20.0 CALCULUS OF KIDNEY: ICD-10-CM

## 2020-01-29 ENCOUNTER — APPOINTMENT (OUTPATIENT)
Dept: UROLOGY | Facility: CLINIC | Age: 65
End: 2020-01-29
Payer: MEDICARE

## 2020-01-29 ENCOUNTER — OUTPATIENT (OUTPATIENT)
Dept: OUTPATIENT SERVICES | Facility: HOSPITAL | Age: 65
LOS: 1 days | End: 2020-01-29
Payer: MEDICARE

## 2020-01-29 DIAGNOSIS — R35.0 FREQUENCY OF MICTURITION: ICD-10-CM

## 2020-01-29 PROCEDURE — 76775 US EXAM ABDO BACK WALL LIM: CPT | Mod: 26

## 2020-01-29 PROCEDURE — 99213 OFFICE O/P EST LOW 20 MIN: CPT | Mod: 25

## 2020-01-29 PROCEDURE — 76775 US EXAM ABDO BACK WALL LIM: CPT

## 2020-01-29 NOTE — HISTORY OF PRESENT ILLNESS
[FreeTextEntry1] : Pt is now 63 yo male, pt (and friend) of Reece Megha, with recurrent stones. H/o removal by Dr. Nguyen ~ 20 years ago- may have had some uric acid component, currently on allopurinol since; however, has had several recurrent stones, most recently presenting to [facility] with pain, nausea 9/23/17. Started pot citrate 20 meq bid... now only taking 10 meq bid. Has not been checking pH. He reports recent exam and PSA wnl (and prefers to avoid additional exam here)\par \par \par Presumed mixed stone risks--> on pot citrate 10 bid, but finds cost prohibitive.\par \par Offers no new complaints, feeling well.  No changes to meds/.allergies. [None] : no symptoms

## 2020-01-29 NOTE — PHYSICAL EXAM
[General Appearance - Well Developed] : well developed [General Appearance - Well Nourished] : well nourished [Well Groomed] : well groomed [General Appearance - In No Acute Distress] : no acute distress [Normal Appearance] : normal appearance [Abdomen Tenderness] : non-tender [Abdomen Soft] : soft [Costovertebral Angle Tenderness] : no ~M costovertebral angle tenderness [] : no respiratory distress [Respiration, Rhythm And Depth] : normal respiratory rhythm and effort [Exaggerated Use Of Accessory Muscles For Inspiration] : no accessory muscle use [Affect] : the affect was normal [Oriented To Time, Place, And Person] : oriented to person, place, and time [Mood] : the mood was normal [Not Anxious] : not anxious [No Focal Deficits] : no focal deficits [Normal Station and Gait] : the gait and station were normal for the patient's age

## 2020-01-29 NOTE — ASSESSMENT
[FreeTextEntry1] : Renal US reviewed: echogenic focus in right mid parenchyma approx 6 mm, 3.8 mm at left mid kidney with twinkle as well. Lobular kidneys with probable scar left kidney at site of echogenic focus. No solid renal mass. No hydro. Small simple renal cysts bilat upper pole.\par  \par recommended to pt to take ~ 30 meq per day of pot citrate, as he reports urine pH "5.7", but he may switch to litholyte/litholyte coffee.\par \par Diet mod, pot citrate\par Pt prefers to avoid physical today\par RTC 12 months with renal US\par \par

## 2020-02-10 DIAGNOSIS — N20.0 CALCULUS OF KIDNEY: ICD-10-CM

## 2020-02-10 DIAGNOSIS — R82.991 HYPOCITRATURIA: ICD-10-CM

## 2020-02-10 DIAGNOSIS — N20.9 URINARY CALCULUS, UNSPECIFIED: ICD-10-CM

## 2020-04-14 ENCOUNTER — RX RENEWAL (OUTPATIENT)
Age: 65
End: 2020-04-14

## 2020-11-28 ENCOUNTER — RX RENEWAL (OUTPATIENT)
Age: 65
End: 2020-11-28

## 2021-01-27 ENCOUNTER — APPOINTMENT (OUTPATIENT)
Dept: ULTRASOUND IMAGING | Facility: IMAGING CENTER | Age: 66
End: 2021-01-27

## 2021-07-19 ENCOUNTER — OUTPATIENT (OUTPATIENT)
Dept: OUTPATIENT SERVICES | Facility: HOSPITAL | Age: 66
LOS: 1 days | End: 2021-07-19
Payer: MEDICARE

## 2021-07-19 DIAGNOSIS — M54.16 RADICULOPATHY, LUMBAR REGION: ICD-10-CM

## 2021-07-19 PROCEDURE — 62323 NJX INTERLAMINAR LMBR/SAC: CPT

## 2021-08-04 ENCOUNTER — APPOINTMENT (OUTPATIENT)
Dept: UROLOGY | Facility: CLINIC | Age: 66
End: 2021-08-04
Payer: MEDICARE

## 2021-08-04 ENCOUNTER — OUTPATIENT (OUTPATIENT)
Dept: OUTPATIENT SERVICES | Facility: HOSPITAL | Age: 66
LOS: 1 days | End: 2021-08-04
Payer: MEDICARE

## 2021-08-04 DIAGNOSIS — N20.0 CALCULUS OF KIDNEY: ICD-10-CM

## 2021-08-04 DIAGNOSIS — N52.9 MALE ERECTILE DYSFUNCTION, UNSPECIFIED: ICD-10-CM

## 2021-08-04 DIAGNOSIS — N20.9 URINARY CALCULUS, UNSPECIFIED: ICD-10-CM

## 2021-08-04 DIAGNOSIS — R82.991 HYPOCITRATURIA: ICD-10-CM

## 2021-08-04 DIAGNOSIS — R35.0 FREQUENCY OF MICTURITION: ICD-10-CM

## 2021-08-04 PROCEDURE — 99214 OFFICE O/P EST MOD 30 MIN: CPT

## 2021-08-04 PROCEDURE — 76775 US EXAM ABDO BACK WALL LIM: CPT | Mod: 26

## 2021-08-04 PROCEDURE — 76775 US EXAM ABDO BACK WALL LIM: CPT

## 2021-08-04 NOTE — ASSESSMENT
[FreeTextEntry1] : Renal US reviewed: echogenic focus in right mid parenchyma approx 6 mm, unchanged. Lobular kidneys with probable scar left kidney at site of echogenic focus. No solid renal mass. No hydro. Small simple renal cysts bilat upper pole.\par  \par recommended to pt to take ~ 30 meq per day of pot citrate, as he reports urine pH "5.7", but he may switch to litholyte/litholyte coffee.\par \par Diet mod, pot citrate\par Pt prefers to avoid physical today\par RTC 12 months with renal US\par will f/u with PCP for routine yearly.\par viagra reviewed, renewed per pt request; no other changes to ED issues\par \par

## 2021-08-04 NOTE — HISTORY OF PRESENT ILLNESS
[FreeTextEntry1] : Pt is now 66 yo male, pt (and friend) of Reece Megha, with recurrent stones. H/o removal by Dr. Nguyen ~ 20 years ago- may have had some uric acid component, currently on allopurinol since; however, has had several recurrent stones, most recently presenting to [facility] with pain, nausea 9/23/17. Started pot citrate 20 meq bid... now only taking 10 meq bid. Has not been checking pH. He reports recent exam and PSA wnl (and prefers to avoid additional exam here). F/u with his PCP for that.\par \par Presumed mixed stone risks--> on pot citrate 10 bid, but finds cost prohibitive.\par \par Offers no new complaints, feeling well.  No changes to meds/.allergies. [None] : no symptoms

## 2022-01-04 RX ORDER — SILDENAFIL 100 MG/1
100 TABLET, FILM COATED ORAL DAILY
Qty: 30 | Refills: 6 | Status: ACTIVE | COMMUNITY
Start: 2019-07-03 | End: 1900-01-01

## 2022-04-15 ENCOUNTER — APPOINTMENT (OUTPATIENT)
Dept: UROLOGY | Facility: CLINIC | Age: 67
End: 2022-04-15
Payer: MEDICARE

## 2022-04-15 VITALS — HEART RATE: 109 BPM | DIASTOLIC BLOOD PRESSURE: 95 MMHG | SYSTOLIC BLOOD PRESSURE: 149 MMHG

## 2022-04-15 PROCEDURE — 51798 US URINE CAPACITY MEASURE: CPT

## 2022-04-15 PROCEDURE — 99214 OFFICE O/P EST MOD 30 MIN: CPT

## 2022-04-15 NOTE — ASSESSMENT
[FreeTextEntry1] : Bladder scan today: 61 cc pvr-- emptying reasonably\par \par extend bactrim ds to 2 weeks for male with likely infection\par \par RTC for cysto- recommend based on uti, and CT findings, hematuria report.\par \par Pt with h/o possible meatal or fossa stricture- will asses\par \par

## 2022-04-15 NOTE — HISTORY OF PRESENT ILLNESS
[None] : no symptoms [FreeTextEntry1] : Pt is now 65 yo male, pt (and friend) of Reece Cleveland, with recurrent stones. H/o removal by Dr. Nguyen ~ 20 years ago- may have had some uric acid component, currently on allopurinol since; \par \par Most recently, was in [place] with voiding issues, dysuria, small amount of blood- had + nitrites on u/a. Taking bactrim (given 1 week, now on day 5-6), though I have no culture results. CT done with report showing nonobstructing intrarenal calculi, and layering dense material in bladder, posterior thickening.\par \par On pot citrate 20 meq bid... now only taking 10 meq bid. \par \par Voiding improving slowly now. Doubled dose for tamsulosin at this time.

## 2022-04-15 NOTE — PHYSICAL EXAM
[General Appearance - Well Developed] : well developed [General Appearance - Well Nourished] : well nourished [Normal Appearance] : normal appearance [Well Groomed] : well groomed [Abdomen Soft] : soft [General Appearance - In No Acute Distress] : no acute distress [Abdomen Tenderness] : non-tender [Costovertebral Angle Tenderness] : no ~M costovertebral angle tenderness [Urinary Bladder Findings] : the bladder was normal on palpation [Edema] : no peripheral edema [] : no respiratory distress [Respiration, Rhythm And Depth] : normal respiratory rhythm and effort [Exaggerated Use Of Accessory Muscles For Inspiration] : no accessory muscle use [Oriented To Time, Place, And Person] : oriented to person, place, and time [Affect] : the affect was normal [Mood] : the mood was normal [Not Anxious] : not anxious [Normal Station and Gait] : the gait and station were normal for the patient's age [No Focal Deficits] : no focal deficits [FreeTextEntry1] : r

## 2022-04-19 ENCOUNTER — NON-APPOINTMENT (OUTPATIENT)
Age: 67
End: 2022-04-19

## 2022-05-12 ENCOUNTER — APPOINTMENT (OUTPATIENT)
Dept: UROLOGY | Facility: CLINIC | Age: 67
End: 2022-05-12
Payer: MEDICARE

## 2022-05-12 VITALS
DIASTOLIC BLOOD PRESSURE: 77 MMHG | HEART RATE: 70 BPM | TEMPERATURE: 97.5 F | SYSTOLIC BLOOD PRESSURE: 116 MMHG | OXYGEN SATURATION: 99 % | RESPIRATION RATE: 16 BRPM

## 2022-05-12 DIAGNOSIS — N39.0 URINARY TRACT INFECTION, SITE NOT SPECIFIED: ICD-10-CM

## 2022-05-12 DIAGNOSIS — N99.115 POSTPROCEDURAL FOSSA NAVICULARIS URETHRAL STRICTURE: ICD-10-CM

## 2022-05-12 DIAGNOSIS — A49.9 URINARY TRACT INFECTION, SITE NOT SPECIFIED: ICD-10-CM

## 2022-05-12 DIAGNOSIS — R82.991 HYPOCITRATURIA: ICD-10-CM

## 2022-05-12 PROCEDURE — 52281 CYSTOSCOPY AND TREATMENT: CPT

## 2022-05-18 LAB — BACTERIA UR CULT: NORMAL

## 2022-05-19 ENCOUNTER — NON-APPOINTMENT (OUTPATIENT)
Age: 67
End: 2022-05-19

## 2022-05-20 ENCOUNTER — OUTPATIENT (OUTPATIENT)
Dept: OUTPATIENT SERVICES | Facility: HOSPITAL | Age: 67
LOS: 1 days | End: 2022-05-20
Payer: MEDICARE

## 2022-05-20 ENCOUNTER — OUTPATIENT (OUTPATIENT)
Dept: OUTPATIENT SERVICES | Facility: HOSPITAL | Age: 67
LOS: 1 days | End: 2022-05-20

## 2022-05-20 VITALS
HEART RATE: 80 BPM | WEIGHT: 171.08 LBS | SYSTOLIC BLOOD PRESSURE: 115 MMHG | HEIGHT: 66.5 IN | TEMPERATURE: 98 F | DIASTOLIC BLOOD PRESSURE: 81 MMHG | OXYGEN SATURATION: 98 % | RESPIRATION RATE: 20 BRPM

## 2022-05-20 DIAGNOSIS — Z98.890 OTHER SPECIFIED POSTPROCEDURAL STATES: Chronic | ICD-10-CM

## 2022-05-20 DIAGNOSIS — Z95.5 PRESENCE OF CORONARY ANGIOPLASTY IMPLANT AND GRAFT: ICD-10-CM

## 2022-05-20 DIAGNOSIS — Z01.818 ENCOUNTER FOR OTHER PREPROCEDURAL EXAMINATION: ICD-10-CM

## 2022-05-20 DIAGNOSIS — I10 ESSENTIAL (PRIMARY) HYPERTENSION: ICD-10-CM

## 2022-05-20 DIAGNOSIS — Z95.5 PRESENCE OF CORONARY ANGIOPLASTY IMPLANT AND GRAFT: Chronic | ICD-10-CM

## 2022-05-20 DIAGNOSIS — Z11.52 ENCOUNTER FOR SCREENING FOR COVID-19: ICD-10-CM

## 2022-05-20 DIAGNOSIS — Z96.651 PRESENCE OF RIGHT ARTIFICIAL KNEE JOINT: Chronic | ICD-10-CM

## 2022-05-20 DIAGNOSIS — Z98.49 CATARACT EXTRACTION STATUS, UNSPECIFIED EYE: Chronic | ICD-10-CM

## 2022-05-20 DIAGNOSIS — Z96.0 PRESENCE OF UROGENITAL IMPLANTS: Chronic | ICD-10-CM

## 2022-05-20 DIAGNOSIS — N32.9 BLADDER DISORDER, UNSPECIFIED: ICD-10-CM

## 2022-05-20 DIAGNOSIS — E78.5 HYPERLIPIDEMIA, UNSPECIFIED: Chronic | ICD-10-CM

## 2022-05-20 LAB
ANION GAP SERPL CALC-SCNC: 13 MMOL/L — SIGNIFICANT CHANGE UP (ref 5–17)
BUN SERPL-MCNC: 16 MG/DL — SIGNIFICANT CHANGE UP (ref 7–23)
CALCIUM SERPL-MCNC: 9.1 MG/DL — SIGNIFICANT CHANGE UP (ref 8.4–10.5)
CHLORIDE SERPL-SCNC: 103 MMOL/L — SIGNIFICANT CHANGE UP (ref 96–108)
CO2 SERPL-SCNC: 25 MMOL/L — SIGNIFICANT CHANGE UP (ref 22–31)
CREAT SERPL-MCNC: 1.07 MG/DL — SIGNIFICANT CHANGE UP (ref 0.5–1.3)
EGFR: 76 ML/MIN/1.73M2 — SIGNIFICANT CHANGE UP
GLUCOSE SERPL-MCNC: 96 MG/DL — SIGNIFICANT CHANGE UP (ref 70–99)
HCT VFR BLD CALC: 42.6 % — SIGNIFICANT CHANGE UP (ref 39–50)
HGB BLD-MCNC: 14.1 G/DL — SIGNIFICANT CHANGE UP (ref 13–17)
MCHC RBC-ENTMCNC: 29.7 PG — SIGNIFICANT CHANGE UP (ref 27–34)
MCHC RBC-ENTMCNC: 33.1 GM/DL — SIGNIFICANT CHANGE UP (ref 32–36)
MCV RBC AUTO: 89.7 FL — SIGNIFICANT CHANGE UP (ref 80–100)
NRBC # BLD: 0 /100 WBCS — SIGNIFICANT CHANGE UP (ref 0–0)
PLATELET # BLD AUTO: 210 K/UL — SIGNIFICANT CHANGE UP (ref 150–400)
POTASSIUM SERPL-MCNC: 3.8 MMOL/L — SIGNIFICANT CHANGE UP (ref 3.5–5.3)
POTASSIUM SERPL-SCNC: 3.8 MMOL/L — SIGNIFICANT CHANGE UP (ref 3.5–5.3)
RBC # BLD: 4.75 M/UL — SIGNIFICANT CHANGE UP (ref 4.2–5.8)
RBC # FLD: 13.8 % — SIGNIFICANT CHANGE UP (ref 10.3–14.5)
SARS-COV-2 RNA SPEC QL NAA+PROBE: SIGNIFICANT CHANGE UP
SODIUM SERPL-SCNC: 141 MMOL/L — SIGNIFICANT CHANGE UP (ref 135–145)
WBC # BLD: 7.26 K/UL — SIGNIFICANT CHANGE UP (ref 3.8–10.5)
WBC # FLD AUTO: 7.26 K/UL — SIGNIFICANT CHANGE UP (ref 3.8–10.5)

## 2022-05-20 PROCEDURE — C9803: CPT

## 2022-05-20 PROCEDURE — U0003: CPT

## 2022-05-20 PROCEDURE — 85027 COMPLETE CBC AUTOMATED: CPT

## 2022-05-20 PROCEDURE — G0463: CPT

## 2022-05-20 PROCEDURE — 36415 COLL VENOUS BLD VENIPUNCTURE: CPT

## 2022-05-20 PROCEDURE — U0005: CPT

## 2022-05-20 PROCEDURE — 80048 BASIC METABOLIC PNL TOTAL CA: CPT

## 2022-05-20 RX ORDER — ALLOPURINOL 300 MG
0 TABLET ORAL
Qty: 0 | Refills: 0 | DISCHARGE

## 2022-05-20 RX ORDER — ATORVASTATIN CALCIUM 80 MG/1
0 TABLET, FILM COATED ORAL
Qty: 0 | Refills: 0 | DISCHARGE

## 2022-05-20 RX ORDER — DIAZEPAM 5 MG
0 TABLET ORAL
Qty: 0 | Refills: 0 | DISCHARGE

## 2022-05-20 RX ORDER — SODIUM CHLORIDE 9 MG/ML
1000 INJECTION, SOLUTION INTRAVENOUS
Refills: 0 | Status: DISCONTINUED | OUTPATIENT
Start: 2022-05-23 | End: 2022-06-06

## 2022-05-20 RX ORDER — ASPIRIN/CALCIUM CARB/MAGNESIUM 324 MG
0 TABLET ORAL
Qty: 0 | Refills: 0 | DISCHARGE

## 2022-05-20 RX ORDER — CLOPIDOGREL BISULFATE 75 MG/1
0 TABLET, FILM COATED ORAL
Qty: 0 | Refills: 0 | DISCHARGE

## 2022-05-20 RX ORDER — METOPROLOL TARTRATE 50 MG
0 TABLET ORAL
Qty: 0 | Refills: 0 | DISCHARGE

## 2022-05-20 RX ORDER — LISINOPRIL 2.5 MG/1
1 TABLET ORAL
Qty: 0 | Refills: 0 | DISCHARGE

## 2022-05-20 NOTE — H&P PST ADULT - NSICDXPASTMEDICALHX_GEN_ALL_CORE_FT
PAST MEDICAL HISTORY:  2019 novel coronavirus disease (COVID-19) 4/2022- cold symptoms    BPH (benign prostatic hyperplasia)     GERD (gastroesophageal reflux disease)     Hyperlipidemia     Hypertension     Kidney stones     Lesion of bladder     Lumbar disc disease     Silent myocardial infarction 2016     PAST MEDICAL HISTORY:  2019 novel coronavirus disease (COVID-19) 4/2022- cold symptoms    BPH (benign prostatic hyperplasia)     CAD (coronary artery disease)     GERD (gastroesophageal reflux disease)     Hyperlipidemia     Hypertension     Kidney stones     Lesion of bladder     Lumbar disc disease     Silent myocardial infarction 2016

## 2022-05-20 NOTE — H&P PST ADULT - PROBLEM SELECTOR PROBLEM 2
Attempt to call patient back to room, no answer.   
Pt given discharge instructions/prescription given/ home care instructions explained, pt verbalized understanding of instructions given/pt understands the importance of follow up, pt ambulatory to ER kacey.    
Hypertension

## 2022-05-20 NOTE — H&P PST ADULT - FALL HARM RISK - UNIVERSAL INTERVENTIONS
Bed in lowest position, wheels locked, appropriate side rails in place/Instruct patient to call for assistance before getting out of bed or chair/Non-slip footwear when patient is out of bed/[place] to call system/Physically safe environment - no spills, clutter or unnecessary equipment/Purposeful Proactive Rounding/Room/bathroom lighting operational, light cord in reach

## 2022-05-20 NOTE — H&P PST ADULT - ATTENDING COMMENTS
consent reviewed, signed, for cysto, turbt planned  options, risks, alternatives, benefits all reviewed

## 2022-05-20 NOTE — H&P PST ADULT - NSANTHOSAYNRD_GEN_A_CORE
No. FRANCISCO J screening performed.  STOP BANG Legend: 0-2 = LOW Risk; 3-4 = INTERMEDIATE Risk; 5-8 = HIGH Risk

## 2022-05-20 NOTE — H&P PST ADULT - NSICDXPASTSURGICALHX_GEN_ALL_CORE_FT
PAST SURGICAL HISTORY:  History of vocal cord polypectomy 15 ysra go    S/P Achilles tendon repair left    S/P cataract surgery bilateral    S/P cystoscopy with ureteral stent placement 20 yrs ago for kidney stones    S/P total knee replacement, right     Stented coronary artery 2017 , 2 stents

## 2022-05-20 NOTE — H&P PST ADULT - HISTORY OF PRESENT ILLNESS
66 yr old male with history of CAD - s/p cardiac stent 2016, HTN, with difficulty urination - cleared with antibiotics    Denies Recent travel, Exposure or Covid symptoms  Covid test- 5/20/2022.   66 yr old male with history of CAD - s/p cardiac stent 2016, HTN, with difficulty urination - cleared with antibiotics, had cystoscopy - that revealed bladder lesion & narrowing of urethra. Now coming in for Cystoscopy, Transurethral resection of Bladder lesion on 5/23/2022.     Denies Recent travel, Exposure or Covid symptoms  Covid test- 5/20/2022.   66 yr old male with history of CAD - s/p cardiac stent 2016, HTN, with difficulty urination - cleared with antibiotics, had cystoscopy - that revealed bladder lesion. Now coming in for Cystoscopy, Transurethral resection of Bladder lesion on 5/23/2022.     Denies Recent travel, Exposure or Covid symptoms  Covid test- 5/20/2022.

## 2022-05-22 ENCOUNTER — TRANSCRIPTION ENCOUNTER (OUTPATIENT)
Age: 67
End: 2022-05-22

## 2022-05-23 ENCOUNTER — APPOINTMENT (OUTPATIENT)
Dept: UROLOGY | Facility: HOSPITAL | Age: 67
End: 2022-05-23

## 2022-05-23 ENCOUNTER — RESULT REVIEW (OUTPATIENT)
Age: 67
End: 2022-05-23

## 2022-05-23 ENCOUNTER — OUTPATIENT (OUTPATIENT)
Dept: OUTPATIENT SERVICES | Facility: HOSPITAL | Age: 67
LOS: 1 days | End: 2022-05-23
Payer: MEDICARE

## 2022-05-23 ENCOUNTER — TRANSCRIPTION ENCOUNTER (OUTPATIENT)
Age: 67
End: 2022-05-23

## 2022-05-23 VITALS
HEART RATE: 73 BPM | SYSTOLIC BLOOD PRESSURE: 148 MMHG | HEIGHT: 66.5 IN | OXYGEN SATURATION: 99 % | TEMPERATURE: 98 F | WEIGHT: 171.08 LBS | DIASTOLIC BLOOD PRESSURE: 85 MMHG | RESPIRATION RATE: 16 BRPM

## 2022-05-23 VITALS
DIASTOLIC BLOOD PRESSURE: 78 MMHG | HEART RATE: 67 BPM | OXYGEN SATURATION: 99 % | RESPIRATION RATE: 16 BRPM | TEMPERATURE: 98 F | SYSTOLIC BLOOD PRESSURE: 150 MMHG

## 2022-05-23 DIAGNOSIS — Z95.5 PRESENCE OF CORONARY ANGIOPLASTY IMPLANT AND GRAFT: Chronic | ICD-10-CM

## 2022-05-23 DIAGNOSIS — Z98.890 OTHER SPECIFIED POSTPROCEDURAL STATES: Chronic | ICD-10-CM

## 2022-05-23 DIAGNOSIS — Z96.651 PRESENCE OF RIGHT ARTIFICIAL KNEE JOINT: Chronic | ICD-10-CM

## 2022-05-23 DIAGNOSIS — Z98.49 CATARACT EXTRACTION STATUS, UNSPECIFIED EYE: Chronic | ICD-10-CM

## 2022-05-23 DIAGNOSIS — Z96.0 PRESENCE OF UROGENITAL IMPLANTS: Chronic | ICD-10-CM

## 2022-05-23 DIAGNOSIS — N32.9 BLADDER DISORDER, UNSPECIFIED: ICD-10-CM

## 2022-05-23 PROCEDURE — 88305 TISSUE EXAM BY PATHOLOGIST: CPT | Mod: 26

## 2022-05-23 PROCEDURE — 88305 TISSUE EXAM BY PATHOLOGIST: CPT

## 2022-05-23 PROCEDURE — 52234 CYSTOSCOPY AND TREATMENT: CPT

## 2022-05-23 RX ORDER — OXYCODONE HYDROCHLORIDE 5 MG/1
5 TABLET ORAL ONCE
Refills: 0 | Status: DISCONTINUED | OUTPATIENT
Start: 2022-05-23 | End: 2022-05-23

## 2022-05-23 RX ORDER — DULOXETINE HYDROCHLORIDE 30 MG/1
1 CAPSULE, DELAYED RELEASE ORAL
Qty: 0 | Refills: 0 | DISCHARGE

## 2022-05-23 RX ORDER — LIDOCAINE HCL 20 MG/ML
0.2 VIAL (ML) INJECTION ONCE
Refills: 0 | Status: COMPLETED | OUTPATIENT
Start: 2022-05-23 | End: 2022-05-23

## 2022-05-23 RX ORDER — ASPIRIN/CALCIUM CARB/MAGNESIUM 324 MG
1 TABLET ORAL
Qty: 0 | Refills: 0 | DISCHARGE

## 2022-05-23 RX ORDER — CEFAZOLIN SODIUM 1 G
2000 VIAL (EA) INJECTION ONCE
Refills: 0 | Status: COMPLETED | OUTPATIENT
Start: 2022-05-23 | End: 2022-05-23

## 2022-05-23 RX ORDER — ATORVASTATIN CALCIUM 80 MG/1
1 TABLET, FILM COATED ORAL
Qty: 0 | Refills: 0 | DISCHARGE

## 2022-05-23 RX ORDER — ALLOPURINOL 300 MG
1 TABLET ORAL
Qty: 0 | Refills: 0 | DISCHARGE

## 2022-05-23 RX ORDER — METOPROLOL TARTRATE 50 MG
1 TABLET ORAL
Qty: 0 | Refills: 0 | DISCHARGE

## 2022-05-23 RX ORDER — TAMSULOSIN HYDROCHLORIDE 0.4 MG/1
1 CAPSULE ORAL
Qty: 0 | Refills: 0 | DISCHARGE

## 2022-05-23 RX ORDER — PANTOPRAZOLE SODIUM 20 MG/1
1 TABLET, DELAYED RELEASE ORAL
Qty: 0 | Refills: 0 | DISCHARGE

## 2022-05-23 RX ORDER — ONDANSETRON 8 MG/1
4 TABLET, FILM COATED ORAL ONCE
Refills: 0 | Status: DISCONTINUED | OUTPATIENT
Start: 2022-05-23 | End: 2022-06-06

## 2022-05-23 RX ORDER — FENTANYL CITRATE 50 UG/ML
25 INJECTION INTRAVENOUS
Refills: 0 | Status: DISCONTINUED | OUTPATIENT
Start: 2022-05-23 | End: 2022-05-23

## 2022-05-23 RX ORDER — AZELASTINE 137 UG/1
2 SPRAY, METERED NASAL
Qty: 0 | Refills: 0 | DISCHARGE

## 2022-05-23 RX ORDER — POTASSIUM CITRATE MONOHYDRATE 100 %
1 POWDER (GRAM) MISCELLANEOUS
Qty: 0 | Refills: 0 | DISCHARGE

## 2022-05-23 RX ORDER — LISINOPRIL 2.5 MG/1
1 TABLET ORAL
Qty: 0 | Refills: 0 | DISCHARGE

## 2022-05-23 RX ORDER — DIAZEPAM 5 MG
1 TABLET ORAL
Qty: 0 | Refills: 0 | DISCHARGE

## 2022-05-23 RX ADMIN — OXYCODONE HYDROCHLORIDE 5 MILLIGRAM(S): 5 TABLET ORAL at 12:17

## 2022-05-23 RX ADMIN — SODIUM CHLORIDE 100 MILLILITER(S): 9 INJECTION, SOLUTION INTRAVENOUS at 08:57

## 2022-05-23 RX ADMIN — OXYCODONE HYDROCHLORIDE 5 MILLIGRAM(S): 5 TABLET ORAL at 11:47

## 2022-05-23 NOTE — PRE-ANESTHESIA EVALUATION ADULT - BMI (KG/M2)
Pediatric Surgery Consult    Patient ID: Boy is a 6 week old male.    No chief complaint on file.      HPI:     Pt is a 6 week old, ex- 35 wker, with PMH of RDS, SGA, polydactyly of both hands, dysmorphic features, and klinefelter syndrome. Pediatric general surgery consulted for a gastrostomy tube placement after pt failed to be extubated after multiple attempts. Pt is on a conventional ventilator, FiO2 21%.   Pt has been tolerating goal NG feeds, 55 ml q3hr of similac advanced. Voiding and stooling appropriately. Most recent echo on  showed: mild mitral valve regurgitation, no PDA, hyperdynamic under-filled left ventricle with mild cavitary obstruction, hyperdynamic right ventricle without obstruction, and stretch patent foramen ovale versus small secundum atrial septal defect with left-to-right shunt. Genetics,Peds Cards, and Peds ENT are involved. .      Active Problems  Patient Active Problem List   Diagnosis   • RDS (respiratory distress syndrome in the )   • Small for gestational age (SGA)   • Polydactyly of both hands   • Dysmorphic features   • Moderate protein-calorie malnutrition (CMS/HCC)   • Klinefelter syndrome       Past medical history  No past medical history on file.    Past Surgical History  Past Surgical History:   Procedure Laterality Date   • Hb excision skin tags up to 15  2020            Family History  No family history on file.  complete physical examination   Social History        Medications  Current Facility-Administered Medications   Medication Dose Route Frequency Provider Last Rate Last Admin   • cloNIDine (CATAPRES) 20 MCG/ML (compounded) oral suspension 6 mcg  6 mcg Oral Q3H Vee Dittakavi, DO   6 mcg at 20 1144   • morphine oral solution 0.136 mg  0.05 mg/kg (Dosing Weight) Oral Q3H Vee Dittakavi, DO   0.136 mg at 20 1045   • LORazepam (ATIVAN) 2 MG/ML oral solution 0.2 mg  0.2 mg Oral Q6H PRN Katlin Lopez CNP   0.2 mg at 20 0713   •  dextrose 10% - NaCl 0.45% infusion ()   Intravenous Continuous Vee Vyakavi, DO   Stopped at 20 2300   • glycopyrrolate 1 MG/5ML oral solution 0.108 mg  0.04 mg/kg (Dosing Weight) Oral BID Janey Vela, DO   0.108 mg at 20 0706   • acetaminophen (TYLENOL) 160 MG/5ML suspension 40.64 mg  15 mg/kg (Dosing Weight) Oral Q6H PRN Monika Ghotra, DO   40.64 mg at 20 0040   • famotidine (PEPCID) oral suspension 1.36 mg  0.5 mg/kg (Dosing Weight) Oral Daily Flores Almonte, DO   1.36 mg at 20 0816   • cholecalciferol (VITAMIN D) 10 mcg (400 units)/mL oral liquid 10 mcg  10 mcg Oral Daily Monika Ghotra, DO   10 mcg at 20 0816       Allergies  ALLERGIES:  Patient has no known allergies.    Review of Systems:  Review of Systems   Constitutional: Negative.    HENT: Negative.    Respiratory: Negative.    Cardiovascular: Negative.    Gastrointestinal: Negative.    Genitourinary: Negative.         Physical Exam:  Visit Vitals  BP 80/42 (BP Location: LLE - Left lower extremity)   Pulse 150   Temp 98.6 °F (37 °C) (Axillary)   Resp (!) 70   Ht 17.32\" (44 cm)   Wt 2.725 kg (6 lb 0.1 oz)   HC 32.5 cm (12.8\")   SpO2 98%   BMI 14.08 kg/m²     Physical Exam   Constitutional: He is sleeping. No distress.   HENT:   Head: Anterior fontanelle is flat.   Cardiovascular: Regular rhythm.   Pulmonary/Chest:   intubated   Abdominal: Soft. He exhibits no distension. There is no abdominal tenderness. There is no guarding.   Genitourinary:    Penis normal.      Genitourinary Comments: Normally positioned anus.   No inguinal hernia appreciated bilaterally  Testes nl     Skin: Skin is warm and dry. He is not diaphoretic.           LABS:  Lab Results   Component Value Date    SODIUM 136 2020    POTASSIUM 2020    CHLORIDE 105 2020    CO2020    BUN 7 2020    CREATININE 0.15 (L) 2020    GLUCOSE 86 2020     Lab Results   Component Value Date    WBC 17.2  2020    HCT 36.8 (L) 2020    HGB 2020     2020     Lab Results   Component Value Date    COL STRAW (A) 2020    UAPP CLEAR 2020    USPG <1.005 (L) 2020    UPH 2020    UPROT NEGATIVE 2020    UGLU NEGATIVE 2020    UKET NEGATIVE 2020    UBILI NEGATIVE 2020    URBC SMALL (A) 2020    UNITR NEGATIVE 2020    UROB 2020    UWBC NEGATIVE 2020     Lab Results   Component Value Date    AST 55 2020    GPT 13 2020    ALKPT 119 2020    BILIRUBIN 7.9 (H) 2020         Imaging:  XR CHEST AND ABDOMEN  OR INFANT 1 VIEW   Final Result   1.    Increased opacities with a combination of atelectasis and vascular congestion/mild pulmonary overcirculation.   2.   No acute abdominal abnormality.      Electronically Signed by: JEAN THURMAN M.D.    Signed on: 2020 8:53 AM          XR CHEST  1 VIEW   Final Result      ET tube tip remains at the adonis.         Evidence of shifting atelectasis.  There is improved bibasilar aeration however increased right upper lobe opacification.      Electronically Signed by: CAROLYN CRUM M.D.    Signed on: 2020 8:40 AM          XR CHEST  1 VIEW   Final Result      Redemonstrated mild scattered lung opacities suggestive of atelectasis.      ET tube tip again near the adonis.      Electronically Signed by: KALEY ANVA M.D.    Signed on: 2020 2:47 PM          XR CHEST AP OR PA 1 VIEW   Final Result    No significant change from prior exam.              Electronically Signed by: JEAN THURMAN M.D.    Signed on: 2020 8:46 AM          MRI BRAIN WO CONTRAST   Final Result      1.   No acute hemorrhage, infarct, hydrocephalus or intracranial mass effect.      2.   Small volume subdural hygromas overlying the frontal convexities.      3.   Shallow sella with diminutive caliber of the pituitary parenchyma      Electronically  Signed by: SUGAR CHEN MD    Signed on: 2020 12:48 PM          XR CHEST AND ABDOMEN  OR INFANT 1 VIEW   Final Result    Endotracheal tube with its tip just above the adonis.  No acute change within the chest.  Mild bowel distention as described.      Electronically Signed by: KIMBERLY SOTO MD    Signed on: 2020 10:04 AM          XR CHEST AND ABDOMEN  OR INFANT 1 VIEW   Final Result       Aeration of the lungs appears stable in the interval.      Decreased caliber of some air-filled loops of bowel.  Pattern remains nonspecific, but overall nonobstructive.      Electronically Signed by: CAROLYN CRUM M.D.    Signed on: 2020 2:33 PM          XR CHEST AND ABDOMEN  OR INFANT 1 VIEW   Final Result       Slightly increased lung volumes compared with yesterday's exam.      Nonspecific gas pattern with a few distended air-filled loops.      Electronically Signed by: CAROLYN CRUM M.D.    Signed on: 2020 1:31 PM          XR CHEST AND ABDOMEN  OR INFANT 1 VIEW   Final Result       Mild atelectasis on a background of mild diffuse lung haziness, as above.        Nonspecific nonobstructive bowel gas pattern.      Electronically Signed by: KALEY NAVA M.D.    Signed on: 2020 11:00 AM          XR CHEST AND ABDOMEN  OR INFANT 1 VIEW   Final Result       Minimal streaky right upper lobe density, likely mild atelectasis.  Otherwise stable appearance of aeration in the interval.  Nonobstructive bowel gas pattern.      Electronically Signed by: CAROLYN CRUM M.D.    Signed on: 2020 1:13 PM          XR CHEST AND ABDOMEN  OR INFANT 1 VIEW   Final Result   1.   Findings suggest pulmonary hyperperfusion, without change.   2.   No acute abdominal abnormality.      Electronically Signed by: JEAN THURMAN M.D.    Signed on: 2020 9:08 AM          XR CHEST AND ABDOMEN  OR INFANT 1 VIEW   Final Result   1.    No significant change from prior  exam.   2.   No acute abdominal abnormality.      Electronically Signed by: JEAN THURMAN M.D.    Signed on: 2020 12:31 PM          XR CHEST AP OR PA 1 VIEW   Final Result   1. An endotracheal tube is present, terminating at the adonis angled toward the right bronchus.    2. No acute cardiopulmonary disease.    3. Bowel gas pattern is nondistended and nonobstructive.       Electronically Signed by: WINTER WASHINGTON M.D.   Signed on: 2020 06:14 AM      XR CHEST AP OR PA 1 VIEW   Final Result       Hyperinflation, with increased volumes compared with the earlier exam.      Electronically Signed by: CAROLYN CRUM M.D.    Signed on: 2020 10:09 PM          XR CHEST  1 VIEW   Final Result   1.   Increased opacities are exaggerated by lesser inspiratory volumes.   2.   No acute abdominal abnormality.              Electronically Signed by: JEAN THURMAN M.D.    Signed on: 2020 11:27 AM          XR CHEST AND ABDOMEN  OR INFANT 1 VIEW   Final Result   1.   Hyperinflated lungs.   2.   No significant change from prior exam.   3.   No acute abdominal abnormality.              Electronically Signed by: JEAN THURMAN M.D.    Signed on: 2020 8:00 AM          XR CHEST AND ABDOMEN  OR INFANT 1 VIEW   Final Result   1.    Increased upper lobe opacities with apical predominance.   2.   No acute abdominal abnormality.      Electronically Signed by: JEAN THURMAN M.D.    Signed on: 2020 7:42 AM          XR CHEST AND ABDOMEN  OR INFANT 1 VIEW   Final Result       Unremarkable, nonobstructive bowel gas pattern on the current exam.  Lungs remain well aerated.      Electronically Signed by: CAROLYN CRUM M.D.    Signed on: 2020 11:08 AM          XR CHEST AND ABDOMEN  OR INFANT 1 VIEW   Final Result   1.    Mild interstitial prominence.  Resolved atelectases.   2.   Gaseous colonic distention..      Electronically Signed by: JEAN THURMAN M.D.    Signed  on: 2020 10:51 AM          XR CHEST AND ABDOMEN  OR INFANT 1 VIEW   Final Result   1.    Mild right apical and left medial base atelectases are new.   2.   No acute abdominal abnormality.      Electronically Signed by: JEAN THURMAN M.D.    Signed on: 2020 9:08 AM          US ABDOMEN COMPLETE   Final Result   No acute finding.      Electronically Signed by: JULIEN MONTOYA M.D.   Signed on: 2020 10:46 PM      US INFANT HEAD   Final Result   Findings of thalamostriate vasculopathy, bilaterally.    No acute hemorrhage, or ventriculomegaly.      Electronically Signed by: JEAN THURMAN M.D.    Signed on: 2020 4:27 PM          XR CHEST AND ABDOMEN  OR INFANT 1 VIEW   Final Result   Enteric tube is terminating in the stomach. Endotracheal tube is terminating about 11 mm above adonis. Umbilical catheter is terminating at the level T8 vertebral body to the right.       Electronically Signed by: JULIEN MONTOYA M.D.   Signed on: 2020 01:41 AM      XR CHEST AND ABDOMEN  OR INFANT 1 VIEW   Final Result   ET tube is terminating about 7 mm above adonis. Enteric tube is terminating in the stomach.    No bowel dilatation or obstruction. No bowel dilatation or obstruction. Questionable minimal bubbly appearance of the bowel loops are seen along the margin in the left lower quadrant without any bowel dilatation or obstruction. Follow-up is recommended.       Electronically Signed by: JULIEN MONTOYA M.D.   Signed on: 2020 11:25 PM      XR CHEST AND ABDOMEN  OR INFANT 1 VIEW    (Results Pending)       Assessment and Plan:     Pt is a 6 week old, ex- 35 wker, with PMH of RDS, SGA, polydactyly of both hands, dysmorphic features, and klinefelter syndrome. Pediatric general surgery consulted for a gastrostomy tube placement since pt failed to extubate after multiple attempts. Pt is on a conventional ventilator, FiO2 21%.   Pt has been tolerating goal NG feeds, 55 ml q3hr  of similac advanced. Voiding and stooling appropriately. Abdomen is soft, non-tender.     Plan:    - Will tentatively schedule g-tube placement combo case with Dr. Laguna on 12/30.  - Will discuss g-tube procedure with mom.  - COVID 72 hrs prior to procedure  - Please obtain UGI the week prior to the OR case.   - Please call with Peds Surgery with questions or concerns.     Primary management per NICU Team  Patient seen with Dr. Sutton, pediatric general surgery attending.     TERRI DavilaC   Pediatric General Surgery           27.2

## 2022-05-23 NOTE — ASU PATIENT PROFILE, ADULT - NSICDXPASTMEDICALHX_GEN_ALL_CORE_FT
PAST MEDICAL HISTORY:  2019 novel coronavirus disease (COVID-19) 4/2022- cold symptoms    BPH (benign prostatic hyperplasia)     CAD (coronary artery disease)     GERD (gastroesophageal reflux disease)     Hyperlipidemia     Hypertension     Kidney stones     Lesion of bladder     Lumbar disc disease     Silent myocardial infarction 2016

## 2022-05-23 NOTE — ASU DISCHARGE PLAN (ADULT/PEDIATRIC) - NS MD DC FALL RISK RISK
For information on Fall & Injury Prevention, visit: [email or web address] OR  [email or web address] OR  https://www.cdc.gov/steadi/patient.html

## 2022-05-23 NOTE — ASU PATIENT PROFILE, ADULT - FALL HARM RISK - UNIVERSAL INTERVENTIONS
Bed in lowest position, wheels locked, appropriate side rails in place/Call bell, personal items and telephone in reach/Instruct patient to call for assistance before getting out of bed or chair/Non-slip footwear when patient is out of bed/[place] to call system/Physically safe environment - no spills, clutter or unnecessary equipment/Purposeful Proactive Rounding/Room/bathroom lighting operational, light cord in reach

## 2022-05-23 NOTE — ASU PATIENT PROFILE, ADULT - FALL HARM RISK - ATTEMPT OOB
Patient has been experiencing difficulty swallowing x 2 weeks. Feels like there is a lump in her throat. She is taking generic Prevacid 30mg daily but doesn't feel like it's working.  
Patient is able to swallow fluid but has trouble with solids. She would like to have an Upper GI Endoscopy. Order sent to [facility].  
No

## 2022-05-23 NOTE — ASU DISCHARGE PLAN (ADULT/PEDIATRIC) - NURSING INSTRUCTIONS
OK to take Tylenol/Acetaminophen at 5PM TODAY 5/23 for pain and every 6 hours after as needed. OK to take Motrin/Ibuprofen at 5PM TODAY 5/23 for pain and every 6 hours after as needed. If taking both please take 3 hours apart (ex. Tylenol @ 5pm, Motrin @ 8pm)

## 2022-05-23 NOTE — ASU DISCHARGE PLAN (ADULT/PEDIATRIC) - ASU DC SPECIAL INSTRUCTIONSFT
Take tylenol and ibuprofen as needed for pain control.  No need for antibiotics.  Dr Hoenig will set up a follow up appointment.

## 2022-05-23 NOTE — ASU PATIENT PROFILE, ADULT - BRAND OF FIRST COVID-19 BOOSTER
March 2, 2022       Mirlandetrang Galo  [address] [address]      Dear Ms. Galo,    This letter confirms that your procedure with Lindsey Garibay MD on April 22, 2022, has been rescheduled to April 29, 2022.    COVID TEST INSTRUCTIONS - Date of COVID testing  Bring ID   Date: Tuesday, April 26, 2022  Arrival Time:  2:15 PM   Location: [facility] - ACL Lab  1ST Floor [address]  [address] [place], [place]  Once you leave the testing area, it is very important to avoid any potential exposure to COVID-19. Self-quarantine is recommended and minimizes your risk of exposure before your procedure. If you are unable to self-quarantine, please continue to wear a mask, practice social distancing and perform good hand hygiene.”     Please follow these  instructions until surgery or procedure has been completed.  ·  Immediately report any new symptoms or suspected/known exposures to COVID-19 cases to your surgeon office.  · Maintain physical distancing (at least 6 feet) at all times   · Wash hands frequently and thoroughly with soap and water (lather at least 20 seconds) or disinfect with alcohol-based hand  before eating, before touching face/mouth/eyes, after touching shared objects or high touch surfaces.  ·  Regularly clean cell phones, door handles, light switches, faucet and toilet handles, bathrooms, and kitchen surfaces using household disinfectant or hot soapy water.       Please register at: [address] (Please check in on the 2nd floor)  [place], [place]  [id number] [phone]  On: Friday, April 29, 2022  Someone from the facility will call you 2-3 days prior with your procedure and arrival times.    IMPORTANT REQUIREMENTS  You are scheduled with Monitored Anesthesia Care (MAC) sedation and will need to have a legal adult (18 years or older) stay with you overnight the day of your procedure or your procedure will be  canceled.    Please refer to preoperative instructions in the letter previously sent to you. If you have any questions or need to reschedule, please contact me at the telephone number and extension listed below. Thank you for your flexibility and cooperation.    Sincerely,  Mariely [phone]  Surgery Scheduler for Lindsey Garibay MD   [facility] Pre-Admit Department     [employer]

## 2022-05-24 PROBLEM — U07.1 COVID-19: Chronic | Status: ACTIVE | Noted: 2022-05-20

## 2022-05-24 PROBLEM — I25.10 ATHEROSCLEROTIC HEART DISEASE OF NATIVE CORONARY ARTERY WITHOUT ANGINA PECTORIS: Chronic | Status: ACTIVE | Noted: 2022-05-20

## 2022-05-24 PROBLEM — K21.9 GASTRO-ESOPHAGEAL REFLUX DISEASE WITHOUT ESOPHAGITIS: Chronic | Status: ACTIVE | Noted: 2022-05-20

## 2022-05-24 PROBLEM — M51.9 UNSPECIFIED THORACIC, THORACOLUMBAR AND LUMBOSACRAL INTERVERTEBRAL DISC DISORDER: Chronic | Status: ACTIVE | Noted: 2022-05-20

## 2022-05-24 PROBLEM — N32.9 BLADDER DISORDER, UNSPECIFIED: Chronic | Status: ACTIVE | Noted: 2022-05-20

## 2022-05-24 PROBLEM — I21.9 ACUTE MYOCARDIAL INFARCTION, UNSPECIFIED: Chronic | Status: ACTIVE | Noted: 2022-05-20

## 2022-05-24 PROBLEM — N40.0 BENIGN PROSTATIC HYPERPLASIA WITHOUT LOWER URINARY TRACT SYMPTOMS: Chronic | Status: ACTIVE | Noted: 2022-05-20

## 2022-05-24 PROBLEM — E78.5 HYPERLIPIDEMIA, UNSPECIFIED: Chronic | Status: ACTIVE | Noted: 2022-05-20

## 2022-05-25 ENCOUNTER — APPOINTMENT (OUTPATIENT)
Dept: UROLOGY | Facility: CLINIC | Age: 67
End: 2022-05-25
Payer: MEDICARE

## 2022-05-25 PROCEDURE — 51798 US URINE CAPACITY MEASURE: CPT

## 2022-05-25 PROCEDURE — 99213 OFFICE O/P EST LOW 20 MIN: CPT

## 2022-05-26 LAB — SURGICAL PATHOLOGY STUDY: SIGNIFICANT CHANGE UP

## 2022-05-27 ENCOUNTER — APPOINTMENT (OUTPATIENT)
Dept: UROLOGY | Facility: CLINIC | Age: 67
End: 2022-05-27
Payer: MEDICARE

## 2022-05-27 PROCEDURE — 99441: CPT | Mod: 95

## 2022-05-27 NOTE — HISTORY OF PRESENT ILLNESS
[Home] : at home, [unfilled] , at the time of the visit. [Other Location: e.g. Home (Enter Location, City,State)___] : at [unfilled] [Verbal consent obtained from patient] : the patient, [unfilled] [FreeTextEntry1] : The patient-doctor relationship has been established in a face to face fashion via real time video/audio HIPAA compliant communication using telemedicine software. The patient's identity has been confirmed. The patient was previously emailed a copy of the telemedicine consent. They have had a chance to review and has now given verbal consent and has requested care to be assessed and treated via telemedicine. They understand there may be limitations in this process, and that they may need further followup care in the office and/or hospital settings.\par \par Verbal consent given on May 27 2022  3:00PM\par \par GRANT LINTON is a 66 year M who presents for f/u path from cysto turbt/bxes on 5/23/22\par Seen 2 days ago to r/o retention; urinary frequency persists, but still feels emptying, and overall has improved slightly over last 2 days.\par \par Path- benign with squamous metaplasia\par \par \par 05/27/2022 \par \par The patient denies fevers, chills, nausea and/or vomiting, and no unexplained weight loss.\par \par All pertinent parts of the patient PFSH (past medical, family, and social histories), laboratory, radiological studies and available physician notes were reviewed prior to starting the face-to-face portion of the telemedicine visit. Questionnaire results, where appropriate, were discussed with the patient.\par

## 2022-05-27 NOTE — ASSESSMENT
[FreeTextEntry1] : Path reviewed, as well as voiding sx\par overall seems improving\par will cont 2 tamsulosin for now\par starting finasteride\par f/u next week scheduled, will check u/a, pvr at that time

## 2022-06-01 ENCOUNTER — APPOINTMENT (OUTPATIENT)
Dept: UROLOGY | Facility: CLINIC | Age: 67
End: 2022-06-01

## 2022-06-02 ENCOUNTER — APPOINTMENT (OUTPATIENT)
Dept: UROLOGY | Facility: CLINIC | Age: 67
End: 2022-06-02
Payer: MEDICARE

## 2022-06-02 DIAGNOSIS — R35.1 BENIGN PROSTATIC HYPERPLASIA WITH LOWER URINARY TRACT SYMPMS: ICD-10-CM

## 2022-06-02 DIAGNOSIS — N32.9 BLADDER DISORDER, UNSPECIFIED: ICD-10-CM

## 2022-06-02 DIAGNOSIS — N40.1 BENIGN PROSTATIC HYPERPLASIA WITH LOWER URINARY TRACT SYMPMS: ICD-10-CM

## 2022-06-02 PROCEDURE — 51798 US URINE CAPACITY MEASURE: CPT

## 2022-06-02 PROCEDURE — 99213 OFFICE O/P EST LOW 20 MIN: CPT

## 2022-06-03 NOTE — ASSESSMENT
[FreeTextEntry1] : PVR 37 cc by bladder scan\par \par Doing well\par plan rtc approx 3 months\par continue tamsulosin, finasteride at this time

## 2022-06-03 NOTE — HISTORY OF PRESENT ILLNESS
[FreeTextEntry1] : GRANT LINTON is a 66 year M who presents for f/u path from cysto turbt/bxes on 5/23/22\par Seen several days ago to r/o retention; urinary frequency improving, but still feels emptying well, and overall has improved significantly since last week.\par \par Path- benign with squamous metaplasia\par \par

## 2022-06-03 NOTE — PHYSICAL EXAM
[Urinary Bladder Findings] : the bladder was normal on palpation [] : no respiratory distress [Respiration, Rhythm And Depth] : normal respiratory rhythm and effort [Exaggerated Use Of Accessory Muscles For Inspiration] : no accessory muscle use [Oriented To Time, Place, And Person] : oriented to person, place, and time [Affect] : the affect was normal [Mood] : the mood was normal [Not Anxious] : not anxious [Normal Station and Gait] : the gait and station were normal for the patient's age [No Focal Deficits] : no focal deficits

## 2022-06-03 NOTE — PHYSICAL EXAM
[General Appearance - Well Developed] : well developed [General Appearance - Well Nourished] : well nourished [Normal Appearance] : normal appearance [Well Groomed] : well groomed [General Appearance - In No Acute Distress] : no acute distress [Abdomen Soft] : soft [Abdomen Tenderness] : non-tender [Costovertebral Angle Tenderness] : no ~M costovertebral angle tenderness [Urinary Bladder Findings] : the bladder was normal on palpation [] : no respiratory distress [Respiration, Rhythm And Depth] : normal respiratory rhythm and effort [Exaggerated Use Of Accessory Muscles For Inspiration] : no accessory muscle use [Oriented To Time, Place, And Person] : oriented to person, place, and time [Affect] : the affect was normal [Mood] : the mood was normal [Not Anxious] : not anxious [Normal Station and Gait] : the gait and station were normal for the patient's age [No Focal Deficits] : no focal deficits

## 2022-08-05 ENCOUNTER — APPOINTMENT (OUTPATIENT)
Dept: UROLOGY | Facility: CLINIC | Age: 67
End: 2022-08-05

## 2022-12-13 ENCOUNTER — NON-APPOINTMENT (OUTPATIENT)
Age: 67
End: 2022-12-13

## 2023-02-06 ENCOUNTER — RX RENEWAL (OUTPATIENT)
Age: 68
End: 2023-02-06

## 2023-03-27 ENCOUNTER — RX RENEWAL (OUTPATIENT)
Age: 68
End: 2023-03-27

## 2023-03-27 RX ORDER — SILDENAFIL 100 MG/1
100 TABLET, FILM COATED ORAL DAILY
Qty: 30 | Refills: 2 | Status: ACTIVE | COMMUNITY
Start: 2021-08-04 | End: 1900-01-01

## 2023-03-29 ENCOUNTER — APPOINTMENT (OUTPATIENT)
Dept: UROLOGY | Facility: CLINIC | Age: 68
End: 2023-03-29
Payer: MEDICARE

## 2023-03-29 DIAGNOSIS — N52.9 MALE ERECTILE DYSFUNCTION, UNSPECIFIED: ICD-10-CM

## 2023-03-29 DIAGNOSIS — N20.0 CALCULUS OF KIDNEY: ICD-10-CM

## 2023-03-29 DIAGNOSIS — N20.9 URINARY CALCULUS, UNSPECIFIED: ICD-10-CM

## 2023-03-29 LAB
BILIRUB UR QL STRIP: NEGATIVE
CLARITY UR: CLEAR
COLLECTION METHOD: NORMAL
GLUCOSE UR-MCNC: NEGATIVE
HCG UR QL: 0.2 EU/DL
HGB UR QL STRIP.AUTO: NEGATIVE
KETONES UR-MCNC: NEGATIVE
LEUKOCYTE ESTERASE UR QL STRIP: NEGATIVE
NITRITE UR QL STRIP: NEGATIVE
PH UR STRIP: 5.5
PROT UR STRIP-MCNC: NORMAL
SP GR UR STRIP: 1.02

## 2023-03-29 PROCEDURE — 99214 OFFICE O/P EST MOD 30 MIN: CPT

## 2023-03-29 PROCEDURE — 51798 US URINE CAPACITY MEASURE: CPT

## 2023-03-29 RX ORDER — SULFAMETHOXAZOLE AND TRIMETHOPRIM 800; 160 MG/1; MG/1
800-160 TABLET ORAL TWICE DAILY
Qty: 14 | Refills: 0 | Status: COMPLETED | COMMUNITY
Start: 2022-04-15 | End: 2023-03-29

## 2023-03-29 NOTE — PHYSICAL EXAM
[General Appearance - Well Developed] : well developed [Abdomen Soft] : soft [Urinary Bladder Findings] : the bladder was normal on palpation [Skin Color & Pigmentation] : normal skin color and pigmentation [] : no respiratory distress [Oriented To Time, Place, And Person] : oriented to person, place, and time [Urethral Meatus] : meatus normal [Scrotum] : the scrotum was normal [Testes Mass (___cm)] : there were no testicular masses [Rectal Exam - Rectum] : digital rectal exam was normal [No Prostate Nodules] : no prostate nodules [Prostate Size ___ (0-4)] : prostate size [unfilled] (scale: 0-4) [External Hemorrhoid] : an external hemorrhoid was present [Edema] : no peripheral edema [Normal Station and Gait] : the gait and station were normal for the patient's age [No Focal Deficits] : no focal deficits [Tender, Swollen] : that was nontender and non-swollen [Thrombosed] : that was not thrombosed [FreeTextEntry1] : bilateral several cm upper epididymal cysts

## 2023-03-29 NOTE — ASSESSMENT
[FreeTextEntry1] : UA dip- wnl (pH 5.5)\par \par PVR- 88 ml \par \par plan \par 1) Renal US to update- can review by phone\par 2) psa, bmp today- will review\par 3) can RTC 1 year if all well

## 2023-03-31 LAB
ANION GAP SERPL CALC-SCNC: 13 MMOL/L
BUN SERPL-MCNC: 20 MG/DL
CALCIUM SERPL-MCNC: 9.3 MG/DL
CHLORIDE SERPL-SCNC: 103 MMOL/L
CHOLEST SERPL-MCNC: 138 MG/DL
CHOLEST/HDLC SERPL: 2.8 RATIO
CO2 SERPL-SCNC: 26 MMOL/L
CREAT SERPL-MCNC: 0.99 MG/DL
EGFR: 82 ML/MIN/1.73M2
GLUCOSE SERPL-MCNC: 105 MG/DL
HDLC SERPL-MCNC: 50 MG/DL
LDLC SERPL CALC-MCNC: 60 MG/DL
NONHDLC SERPL-MCNC: 88 MG/DL
POTASSIUM SERPL-SCNC: 4.2 MMOL/L
PSA SERPL-MCNC: 1.21 NG/ML
SODIUM SERPL-SCNC: 141 MMOL/L
TRIGL SERPL-MCNC: 144 MG/DL

## 2023-05-01 ENCOUNTER — RX RENEWAL (OUTPATIENT)
Age: 68
End: 2023-05-01

## 2023-05-01 RX ORDER — FINASTERIDE 5 MG/1
5 TABLET, FILM COATED ORAL
Qty: 90 | Refills: 3 | Status: ACTIVE | COMMUNITY
Start: 2022-05-25 | End: 1900-01-01

## 2023-05-01 RX ORDER — ALLOPURINOL 300 MG/1
300 TABLET ORAL DAILY
Qty: 90 | Refills: 3 | Status: ACTIVE | COMMUNITY
Start: 2023-05-01 | End: 1900-01-01

## 2023-06-05 ENCOUNTER — APPOINTMENT (OUTPATIENT)
Dept: ULTRASOUND IMAGING | Facility: IMAGING CENTER | Age: 68
End: 2023-06-05

## 2023-08-28 ENCOUNTER — RX RENEWAL (OUTPATIENT)
Age: 68
End: 2023-08-28

## 2023-08-28 ENCOUNTER — APPOINTMENT (OUTPATIENT)
Dept: PAIN MANAGEMENT | Facility: CLINIC | Age: 68
End: 2023-08-28
Payer: MEDICARE

## 2023-08-28 DIAGNOSIS — M17.0 BILATERAL PRIMARY OSTEOARTHRITIS OF KNEE: ICD-10-CM

## 2023-08-28 PROCEDURE — 99213 OFFICE O/P EST LOW 20 MIN: CPT | Mod: 95

## 2023-08-28 RX ORDER — TAMSULOSIN HYDROCHLORIDE 0.4 MG/1
0.4 CAPSULE ORAL
Qty: 90 | Refills: 3 | Status: ACTIVE | COMMUNITY
Start: 2019-07-03 | End: 1900-01-01

## 2023-08-28 NOTE — HISTORY OF PRESENT ILLNESS
[Lower back] : lower back [9] : 9 [8] : 8 [Shooting] : shooting [Stabbing] : stabbing [Constant] : constant [Household chores] : household chores [Sitting] : sitting [Walking] : walking [Home] : at home, [unfilled] , at the time of the visit. [Medical Office: ([facility])___] : at the medical office located in  [Verbal consent obtained from patient] : the patient, [unfilled] [] : no [de-identified] : 01/05/2022 [de-identified] : PT COMPLETED PTA 2-3X A WEEK WITH NO IMRPOVMENT IN PAIN FOR 1 WHOLE YEAR

## 2024-01-05 DIAGNOSIS — N39.0 URINARY TRACT INFECTION, SITE NOT SPECIFIED: ICD-10-CM

## 2024-01-05 DIAGNOSIS — R39.9 UNSPECIFIED SYMPTOMS AND SIGNS INVOLVING THE GENITOURINARY SYSTEM: ICD-10-CM

## 2024-01-05 RX ORDER — SULFAMETHOXAZOLE AND TRIMETHOPRIM 800; 160 MG/1; MG/1
800-160 TABLET ORAL TWICE DAILY
Qty: 20 | Refills: 0 | Status: ACTIVE | COMMUNITY
Start: 2024-01-05 | End: 1900-01-01

## 2024-02-14 RX ORDER — POTASSIUM CITRATE 10 MEQ/1
10 MEQ TABLET, EXTENDED RELEASE ORAL
Qty: 360 | Refills: 3 | Status: ACTIVE | COMMUNITY
Start: 2017-11-22 | End: 1900-01-01

## 2024-02-22 ENCOUNTER — RX RENEWAL (OUTPATIENT)
Age: 69
End: 2024-02-22

## 2024-03-27 ENCOUNTER — EMERGENCY (EMERGENCY)
Facility: HOSPITAL | Age: 69
LOS: 1 days | Discharge: HOME | End: 2024-03-27
Attending: EMERGENCY MEDICINE
Payer: MEDICARE

## 2024-03-27 VITALS
TEMPERATURE: 98 F | HEART RATE: 73 BPM | DIASTOLIC BLOOD PRESSURE: 84 MMHG | RESPIRATION RATE: 18 BRPM | OXYGEN SATURATION: 94 % | SYSTOLIC BLOOD PRESSURE: 130 MMHG

## 2024-03-27 VITALS
OXYGEN SATURATION: 98 % | TEMPERATURE: 97 F | DIASTOLIC BLOOD PRESSURE: 98 MMHG | HEART RATE: 69 BPM | RESPIRATION RATE: 20 BRPM | HEIGHT: 66 IN | SYSTOLIC BLOOD PRESSURE: 165 MMHG | WEIGHT: 173.94 LBS

## 2024-03-27 DIAGNOSIS — Z98.890 OTHER SPECIFIED POSTPROCEDURAL STATES: Chronic | ICD-10-CM

## 2024-03-27 DIAGNOSIS — Z95.5 PRESENCE OF CORONARY ANGIOPLASTY IMPLANT AND GRAFT: Chronic | ICD-10-CM

## 2024-03-27 DIAGNOSIS — Z98.49 CATARACT EXTRACTION STATUS, UNSPECIFIED EYE: Chronic | ICD-10-CM

## 2024-03-27 DIAGNOSIS — Z96.651 PRESENCE OF RIGHT ARTIFICIAL KNEE JOINT: Chronic | ICD-10-CM

## 2024-03-27 DIAGNOSIS — Z96.0 PRESENCE OF UROGENITAL IMPLANTS: Chronic | ICD-10-CM

## 2024-03-27 PROCEDURE — 73590 X-RAY EXAM OF LOWER LEG: CPT

## 2024-03-27 PROCEDURE — 73630 X-RAY EXAM OF FOOT: CPT | Mod: 26,LT

## 2024-03-27 PROCEDURE — 73630 X-RAY EXAM OF FOOT: CPT

## 2024-03-27 PROCEDURE — 99284 EMERGENCY DEPT VISIT MOD MDM: CPT | Mod: 25

## 2024-03-27 PROCEDURE — 73130 X-RAY EXAM OF HAND: CPT

## 2024-03-27 PROCEDURE — 90471 IMMUNIZATION ADMIN: CPT

## 2024-03-27 PROCEDURE — 73110 X-RAY EXAM OF WRIST: CPT

## 2024-03-27 PROCEDURE — 73130 X-RAY EXAM OF HAND: CPT | Mod: 26,LT

## 2024-03-27 PROCEDURE — 73090 X-RAY EXAM OF FOREARM: CPT

## 2024-03-27 PROCEDURE — 90715 TDAP VACCINE 7 YRS/> IM: CPT

## 2024-03-27 PROCEDURE — 73590 X-RAY EXAM OF LOWER LEG: CPT | Mod: 26,RT

## 2024-03-27 PROCEDURE — 73110 X-RAY EXAM OF WRIST: CPT | Mod: 26,LT

## 2024-03-27 PROCEDURE — 12004 RPR S/N/AX/GEN/TRK7.6-12.5CM: CPT

## 2024-03-27 PROCEDURE — 73090 X-RAY EXAM OF FOREARM: CPT | Mod: 26,LT

## 2024-03-27 PROCEDURE — 99284 EMERGENCY DEPT VISIT MOD MDM: CPT | Mod: FS

## 2024-03-27 RX ORDER — ACETAMINOPHEN 500 MG
975 TABLET ORAL ONCE
Refills: 0 | Status: COMPLETED | OUTPATIENT
Start: 2024-03-27 | End: 2024-03-27

## 2024-03-27 RX ORDER — TETANUS TOXOID, REDUCED DIPHTHERIA TOXOID AND ACELLULAR PERTUSSIS VACCINE, ADSORBED 5; 2.5; 8; 8; 2.5 [IU]/.5ML; [IU]/.5ML; UG/.5ML; UG/.5ML; UG/.5ML
0.5 SUSPENSION INTRAMUSCULAR ONCE
Refills: 0 | Status: COMPLETED | OUTPATIENT
Start: 2024-03-27 | End: 2024-03-27

## 2024-03-27 RX ADMIN — Medication 975 MILLIGRAM(S): at 13:47

## 2024-03-27 RX ADMIN — TETANUS TOXOID, REDUCED DIPHTHERIA TOXOID AND ACELLULAR PERTUSSIS VACCINE, ADSORBED 0.5 MILLILITER(S): 5; 2.5; 8; 8; 2.5 SUSPENSION INTRAMUSCULAR at 13:48

## 2024-03-27 NOTE — ED PROVIDER NOTE - PATIENT PORTAL LINK FT
You can access the FollowMyHealth Patient Portal offered by [facility] by registering at the following website: [email or web address]. By joining [facility]’s FollowMyHealth portal, you will also be able to view your health information using other applications (apps) compatible with our system.

## 2024-03-27 NOTE — ED PROVIDER NOTE - CLINICAL SUMMARY MEDICAL DECISION MAKING FREE TEXT BOX
Attending note.  Patient sustained multiple lacerations to the bilateral lower legs and left hand, wrist and forearm when the shower door shattered today.  Tetanus prophylaxis.  Analgesia.  Wound care and removal of any foreign bodies.  Probable x-rays to identify any retained foreign bodies.

## 2024-03-27 NOTE — ED PROVIDER NOTE - NSICDXPASTSURGICALHX_GEN_ALL_CORE_FT
PAST SURGICAL HISTORY:  History of vocal cord polypectomy 15 ysra go    S/P Achilles tendon repair left    S/P cataract surgery bilateral    S/P cystoscopy with ureteral stent placement 20 yrs ago for kidney stones    S/P total knee replacement, right     Stented coronary artery 2017 , 2 stents     COUGH/PAIN

## 2024-03-27 NOTE — ED PROCEDURE NOTE - PROCEDURE ADDITIONAL DETAILS
5 Lacerations total   1. Left upper forearm- 4 cm V shaped flap-  7 sutures placed  2. Left wrist- 2.5 cm- 5 sutures placed  3. left wrist- 1 cm- 2 sutures placed  4. Left hand 1cm- 2 sutures placed  5. Right anterior lower leg- 1 cm v shaped- 2 sutures placed  6.  Left foot 1 cm- 2 sutures placed

## 2024-03-27 NOTE — ED PROVIDER NOTE - PHYSICAL EXAMINATION
Attending note.  Patient is alert and in no acute distress.  Patient has a 1 cm laceration proximal anterior right lower leg, there is a 1 cm laceration on the dorsum of the left foot.  There is a 1 cm laceration on the dorsum of left hand.  There is a 2 cm laceration over the radial side of the wrist.  There is a 4 cm L-shaped laceration on the proximal left forearm.  Neurovascular examination is normal.  There are no visible foreign bodies.  There are no expanding hematomas.

## 2024-03-27 NOTE — ED PROCEDURE NOTE - ATTENDING APP SHARED VISIT CONTRIBUTION OF CARE
5 Lacerations total   1. Left upper forearm- 4 cm V shaped flap-  7 sutures placed  2. Left wrist- 2.5 cm- 5 sutures placed  3. left wrist- 1 cm- 2 sutures placed  sutured with 5-0 nylon

## 2024-03-27 NOTE — ED PROVIDER NOTE - OBJECTIVE STATEMENT
Attending note.  Patient was seen in room #34 to the left.  Patient was brought in by EMS from home after the shower door shattered causing lacerations to his lower legs and left arm and hand.  Patient takes aspirin for coronary stents.  Takes no other anticoagulants.  He denies any numbness or paresthesia.  He is unsure of his last tetanus vaccine.  He has no allergies to medications.

## 2024-03-27 NOTE — ED ADULT NURSE NOTE - NSFALLRISKINTERV_ED_ALL_ED
Assistance OOB with selected safe patient handling equipment if applicable/Assistance with ambulation/Communicate fall risk and risk factors to all staff, patient, and family/Monitor gait and stability/Provide visual cue: yellow wristband, yellow gown, etc/Reinforce activity limits and safety measures with patient and family/Call bell, personal items and telephone in reach/Instruct patient to call for assistance before getting out of bed/chair/stretcher/Non-slip footwear applied when patient is off stretcher/[place] to call system/Physically safe environment - no spills, clutter or unnecessary equipment/Purposeful Proactive Rounding/Room/bathroom lighting operational, light cord in reach

## 2024-03-27 NOTE — ED ADULT NURSE NOTE - OBJECTIVE STATEMENT
Pt brought in by EMS. Pt complains of closing his shower door 2 hrs ago in which it shattered upon closure. Pt sustained 3 abrasions to his L arm, an abrasion to his R knee, and abrasions to his L foot. Pt complains of "burning to his L arm". All abrasions are covered with gauze with a small amount of blood present. Pt experiences bilateral numbness and tingling to his fingertips, cap refill <3 secs, strong pedal pulses and cap refill <3 secs. Pt has a hx of 2 stents and is currently on Aspirin, he has not taken the Aspirin this AM. Pt denies chest pain, chest palpitations, SOB, or lightheaded, dizziness prior to incident. Pt presents at baseline mental status, AAOx4. Plan of care and monitoring discussed with pt. Bed locked and lowered for safety.

## 2024-03-27 NOTE — ED PROVIDER NOTE - NSFOLLOWUPINSTRUCTIONS_ED_ALL_ED_FT
Thank you for visiting our Emergency Department, it has been a pleasure taking part in your healthcare. Please follow up with your primary doctor within x48 hours.    Your discharge diagnosis is: multiple Lacerations      Return precautions to the Emergency Department include but are not limited to: unrelenting nausea, vomiting, fever, chills, chest pain, shortness of breath, dizziness, abdominal pain, worsening pain, syncope, blood in urine or stool, headache that doesn't resolve, numbness or tingling, loss of sensation, loss of motor function, or any other concerning symptoms.     -- Please use 650mg Tylenol (also called acetaminophen) every 4 hours & 600mg Motrin (also called Advil or ibuprofen) every 6 hours as needed for pain/discomfort/swelling. You can get these without a prescription. Don't use more than 3500mg of Tylenol in any 24-hour period. Make sure your other prescription/over-the-counter medications don't contain any Tylenol so you don't take too much. If you have any stomach discomfort while taking Motrin, you can use TUMS or Pepcid or Zantac (these can all be bought without a prescription).       REturn to the ER to have your sutures removed in 8-10 days    Keep wounds clean and dry for first 24 hr, then you may get wounds wet, with water, pat dry gently

## 2024-04-05 ENCOUNTER — EMERGENCY (EMERGENCY)
Facility: HOSPITAL | Age: 69
LOS: 1 days | Discharge: HOME | End: 2024-04-05
Attending: EMERGENCY MEDICINE
Payer: MEDICARE

## 2024-04-05 VITALS
RESPIRATION RATE: 20 BRPM | HEART RATE: 72 BPM | OXYGEN SATURATION: 95 % | WEIGHT: 173.06 LBS | SYSTOLIC BLOOD PRESSURE: 131 MMHG | DIASTOLIC BLOOD PRESSURE: 84 MMHG | TEMPERATURE: 98 F | HEIGHT: 66 IN

## 2024-04-05 DIAGNOSIS — Z98.890 OTHER SPECIFIED POSTPROCEDURAL STATES: Chronic | ICD-10-CM

## 2024-04-05 DIAGNOSIS — Z96.651 PRESENCE OF RIGHT ARTIFICIAL KNEE JOINT: Chronic | ICD-10-CM

## 2024-04-05 DIAGNOSIS — Z95.5 PRESENCE OF CORONARY ANGIOPLASTY IMPLANT AND GRAFT: Chronic | ICD-10-CM

## 2024-04-05 DIAGNOSIS — Z98.49 CATARACT EXTRACTION STATUS, UNSPECIFIED EYE: Chronic | ICD-10-CM

## 2024-04-05 DIAGNOSIS — Z96.0 PRESENCE OF UROGENITAL IMPLANTS: Chronic | ICD-10-CM

## 2024-04-05 PROCEDURE — L9995: CPT

## 2024-04-05 PROCEDURE — 99283 EMERGENCY DEPT VISIT LOW MDM: CPT

## 2024-04-05 RX ORDER — CEPHALEXIN 500 MG
1 CAPSULE ORAL
Qty: 28 | Refills: 0
Start: 2024-04-05 | End: 2024-04-11

## 2024-04-05 NOTE — ED PROVIDER NOTE - PHYSICAL EXAMINATION
CONSTITUTIONAL: Well appearing, awake, alert, and in no apparent distress.  HEAD: normocephalic, atraumatic  ENT: oral mucosa moist  MSK: Spine appears normal, range of motion is not limited, no muscle or joint tenderness  NEURO: Alert and oriented, no focal deficits, no motor or sensory deficits.  SKIN: Skin normal color for race, warm, dry and intact. No evidence of rash.  Left upper forearm with V shaped laceration with 7 sutures placed, left wrist laceration with 2 sutures placed, additional left wrist laceration with 2 sutures placed, right shin laceration with 2 sutures placed, and left foot dorsum with 2 sutures placed; left foot wound with mild surrounding erythema, remainder of wounds well healed, without surrounding erythema, warmth, discharge, or edema  PSYCH: appropriate mood and affect

## 2024-04-05 NOTE — ED PROVIDER NOTE - PATIENT PORTAL LINK FT
You can access the FollowMyHealth Patient Portal offered by [facility] by registering at the following website: [email or web address]. By joining [facility]’s FollowMyHealth portal, you will also be able to view your health information using other applications (apps) compatible with our system.

## 2024-04-05 NOTE — ED ADULT TRIAGE NOTE - RESPIRATORY RATE (BREATHS/MIN)
"Oncology Rooming Note    April 14, 2021 10:11 AM   Ahmet Coleman is a 72 year old male who presents for:    Chief Complaint   Patient presents with     Oncology Clinic Visit     Initial Vitals: /78   Pulse 57   Temp 98.1  F (36.7  C) (Oral)   Resp 16   Wt 86.2 kg (190 lb)   SpO2 96%   BMI 28.06 kg/m   Estimated body mass index is 28.06 kg/m  as calculated from the following:    Height as of 4/1/21: 1.753 m (5' 9\").    Weight as of this encounter: 86.2 kg (190 lb). Body surface area is 2.05 meters squared.  Mild Pain (2) Comment: Data Unavailable   No LMP for male patient.  Allergies reviewed: Yes  Medications reviewed: Yes    Medications: No refills needed  Pharmacy name entered into [facility]:    [id number] - [place], [place] - [address] [facility]  [id number] - [place], [place] - [address] [facility] - [place], [place] - [address] [facility]  [facility] [place] [id number]    Clinical concerns:  No concerns.      Tiffanie Kunz CMA            " 20

## 2024-04-05 NOTE — ED PROVIDER NOTE - OBJECTIVE STATEMENT
68-year-old male, HTN, CAD, HLD, presenting for suture removal after laceration repair done March 27.  Patient was closing a glass shower door which shattered causing multiple lacerations to his left forearm, left foot and right lower extremity.  No fevers or chills at home.

## 2024-04-05 NOTE — ED PROVIDER NOTE - CLINICAL SUMMARY MEDICAL DECISION MAKING FREE TEXT BOX
68-year-old male, HTN, CAD, HLD, presenting for suture removal after laceration repair done March 27.  Patient was closing a glass shower door which shattered causing multiple lacerations to his left forearm, left foot and right lower extremity.  No fevers or chills at home.  VSS.  Left upper forearm with V shaped laceration with 7 sutures placed, left wrist laceration with 2 sutures placed, additional left wrist laceration with 2 sutures placed, right shin laceration with 2 sutures placed, and left foot dorsum with 2 sutures placed; left foot wound with mild surrounding erythema, remainder of wounds well healed, without surrounding erythema, warmth, discharge, or edema.  Plan to remove sutures, rx for abx for cellulitis concern for left foot wound, dc with follow up to pcp.

## 2024-04-05 NOTE — ED PROCEDURE NOTE - PROCEDURE ADDITIONAL DETAILS
-left upper forearm: 7 sutures removed  -left wrist: 5 sutures removed, 2 sutures removed  -left hand dorsum: 2 sutures removed  -left foot dorsum: 2 sutures removed  -right shin: 2 sutures removed    All sutures removed using suture scissors are forceps.  Patient tolerated procedure.  No complications. -left upper forearm: 7 sutures removed  -left wrist: 5 sutures removed, 2 sutures removed  -left hand dorsum: 2 sutures removed  -left foot dorsum: 2 sutures removed  -right shin: 2 sutures removed    Left dorsal foot wound with mild surrounding erythema.  Remainder appear clean, healed, without discharge, erythema or swelling.    All sutures removed using suture scissors are forceps.  Patient tolerated procedure.  No complications. -left upper forearm: 7 sutures removed  -left wrist: 5 sutures removed, 2 sutures removed  -left hand dorsum: 2 sutures removed  -left foot dorsum: 2 sutures removed  -right shin: 2 sutures removed    Left dorsal foot wound with mild surrounding erythema.  Remainder appear clean, healed, without discharge, erythema or swelling.    All sutures on LUE removed using suture scissors are forceps.  RLE and L foot sutures removed using 11 blade.  Patient tolerated procedure.  No complications.

## 2024-04-05 NOTE — ED PROVIDER NOTE - NSFOLLOWUPINSTRUCTIONS_ED_ALL_ED_FT
You were seen in the Emergency Department for suture removal.    We have removed all of your sutures.  We have sent you a prescription for an antibiotic for a possible skin infection.  Follow up with your PCP Dr. Greer in 1-3 days.  Keep all areas clean, dry, and intact.    1) Continue all previously prescribed medications as directed.    2) Follow up with your primary care physician - take copies of your results.    3) Return to the Emergency Department for worsening or persistent symptoms, and/or ANY NEW OR CONCERNING SYMPTOMS.

## 2024-04-05 NOTE — ED PROVIDER NOTE - ATTENDING CONTRIBUTION TO CARE
Attending MD Packer: I personally have seen and examined this patient.  Resident note reviewed and agree on plan of care and except where noted.  See below for details.     Seen in [place] [facility]    68M with PMH/PSH including HTN, HLD, CAD presents to the ED for suture removal.  Reports had shower door break and presented for sutures on 3/27/24.  Review of EMR reveals: Left upper forearm- 4 cm V shaped flap-  7 sutures placed; Left wrist- 2.5 cm- 5 sutures placed; left wrist- 1 cm- 2 sutures placed; Left hand 1cm- 2 sutures placed; Right anterior lower leg- 1 cm v shaped- 2 sutures placed; Left foot 1 cm- 2 sutures placed.  Denies fevers, chills, denies purulence or bleeding from wounds.  Denies limitation of ROM.     Exam:   General: NAD  HENT: head NCAT, airway patent   Chest: symmetric chest rise, no increased work of breathing  MSK: ranging neck freely  Neuro: moving all extremities spontaneously, sensory grossly intact, no gross neuro deficits  Psych: normal mood and affect   Skin: wounds on LUE: L proximal forearm with scabs but well approximated, L wrist and hand well approximated, LUE wounds C/D/I with no surrounding erythema, no fluctuance, R anterior lower leg wound well approximated area C/D/I, L foot well approximated with +erythema and fibrinous exudate    A/P: 68M here for suture removal, see procedure note, will give antibiotic for L foot wound, patient to follow up with PMD in 1-3 days.

## 2024-06-26 ENCOUNTER — APPOINTMENT (OUTPATIENT)
Dept: PAIN MANAGEMENT | Facility: CLINIC | Age: 69
End: 2024-06-26
Payer: MEDICARE

## 2024-06-26 DIAGNOSIS — Z96.651 PRESENCE OF RIGHT ARTIFICIAL KNEE JOINT: ICD-10-CM

## 2024-06-26 PROCEDURE — 99213 OFFICE O/P EST LOW 20 MIN: CPT

## 2024-06-26 RX ORDER — DULOXETINE HYDROCHLORIDE 20 MG/1
20 CAPSULE, DELAYED RELEASE PELLETS ORAL
Qty: 270 | Refills: 0 | Status: ACTIVE | COMMUNITY
Start: 2022-05-18 | End: 1900-01-01

## 2024-07-03 ENCOUNTER — RX RENEWAL (OUTPATIENT)
Age: 69
End: 2024-07-03

## 2024-11-04 ENCOUNTER — RX RENEWAL (OUTPATIENT)
Age: 69
End: 2024-11-04

## 2024-12-24 PROBLEM — F10.90 ALCOHOL USE: Status: ACTIVE | Noted: 2017-10-04

## 2025-05-27 ENCOUNTER — NON-APPOINTMENT (OUTPATIENT)
Age: 70
End: 2025-05-27

## 2025-05-29 ENCOUNTER — APPOINTMENT (OUTPATIENT)
Dept: OTOLARYNGOLOGY | Facility: CLINIC | Age: 70
End: 2025-05-29

## 2025-05-29 PROCEDURE — 92524 BEHAVRAL QUALIT ANALYS VOICE: CPT | Mod: GN

## 2025-06-04 ENCOUNTER — APPOINTMENT (OUTPATIENT)
Dept: OTOLARYNGOLOGY | Facility: CLINIC | Age: 70
End: 2025-06-04

## 2025-06-05 ENCOUNTER — APPOINTMENT (OUTPATIENT)
Dept: OTOLARYNGOLOGY | Facility: CLINIC | Age: 70
End: 2025-06-05
Payer: MEDICARE

## 2025-06-05 PROCEDURE — 92507 TX SP LANG VOICE COMM INDIV: CPT | Mod: GN

## 2025-06-12 ENCOUNTER — APPOINTMENT (OUTPATIENT)
Dept: OTOLARYNGOLOGY | Facility: CLINIC | Age: 70
End: 2025-06-12
Payer: MEDICARE

## 2025-06-12 PROCEDURE — 92507 TX SP LANG VOICE COMM INDIV: CPT | Mod: GN

## 2025-06-19 ENCOUNTER — APPOINTMENT (OUTPATIENT)
Dept: OTOLARYNGOLOGY | Facility: CLINIC | Age: 70
End: 2025-06-19
Payer: MEDICARE

## 2025-06-19 PROCEDURE — 92507 TX SP LANG VOICE COMM INDIV: CPT | Mod: GN

## 2025-06-26 ENCOUNTER — APPOINTMENT (OUTPATIENT)
Dept: OTOLARYNGOLOGY | Facility: CLINIC | Age: 70
End: 2025-06-26

## 2025-06-26 PROCEDURE — 92507 TX SP LANG VOICE COMM INDIV: CPT | Mod: GN

## (undated) DEVICE — SOL IRR BAG H2O 3000ML

## (undated) DEVICE — GLV 7.5 PROTEXIS (WHITE)

## (undated) DEVICE — CABLE DAC ACTIVE CORD

## (undated) DEVICE — FOLEY CATH 3-WAY 24FR 30CC LATEX LUBRICATH

## (undated) DEVICE — PREP BETADINE 5% STERILE OPTHALMIC SOLUTION

## (undated) DEVICE — FOLEY CATH 2-WAY 24FR 5CC LATEX HYDROGEL

## (undated) DEVICE — SYR LUER LOK 30CC

## (undated) DEVICE — IRRIGATION TRAY W PISTON SYRINGE 60ML

## (undated) DEVICE — FOLEY CATH 3-WAY 22FR 30CC LATEX LUBRICATH

## (undated) DEVICE — FOLEY CATH 2-WAY 20FR 30CC SILICONE

## (undated) DEVICE — TUBING RANGER FLUID IRRIGATION SET DISP

## (undated) DEVICE — FOLEY CATH 3-WAY 20FR 30CC LATEX LUBRICATH

## (undated) DEVICE — GOWN TRIMAX LG

## (undated) DEVICE — ELCTR BUTTON

## (undated) DEVICE — FOLEY CATH 3-WAY 24FR 5CC LATEX LUBRICATH

## (undated) DEVICE — WARMING BLANKET UPPER ADULT

## (undated) DEVICE — ELCTR BUGBEE FULGATING 5FR X 58CM

## (undated) DEVICE — ELCTR CUTTING LOOP RIGHT ANGLE 24FR

## (undated) DEVICE — FOLEY CATH 2-WAY 18FR 5CC LATEX HYDROGEL

## (undated) DEVICE — CATH FOLEY 2 WAY 16FR 5CC LATEX HYDROGEL

## (undated) DEVICE — FOLEY CATH 3-WAY 18FR 30CC LATEX LUBRICATH

## (undated) DEVICE — ELCTR GROOVED VAPOR

## (undated) DEVICE — FOLEY HOLDER STATLOCK 2 WAY ADULT

## (undated) DEVICE — FOLEY CATH 2-WAY 20F 5CC LATEX LUBRICATH

## (undated) DEVICE — FOLEY CATH 3-WAY 20FR 5CC LATEX LUBRICATH

## (undated) DEVICE — FOLEY CATH 2-WAY 22FR 5CC LATEX COUNCIL RED

## (undated) DEVICE — SOL IRR BAG NS 0.9% 3000ML

## (undated) DEVICE — PACK CYSTO

## (undated) DEVICE — FOLEY CATH 3-WAY 26FR 30CC LATEX LUBRICATH

## (undated) DEVICE — VENODYNE/SCD SLEEVE CALF LARGE

## (undated) DEVICE — ACMI SELF-SEALING SEAL UP TO 7FR

## (undated) DEVICE — POSITIONER FOAM EGG CRATE ULNAR 2PCS (PINK)

## (undated) DEVICE — ELCTR ROLLER BALL BLK 24-26FR

## (undated) DEVICE — BAG URINE W METER 2L

## (undated) DEVICE — ELCTR VAPORIZT GRV BLK